# Patient Record
Sex: MALE | Race: BLACK OR AFRICAN AMERICAN | NOT HISPANIC OR LATINO | Employment: UNEMPLOYED | URBAN - METROPOLITAN AREA
[De-identification: names, ages, dates, MRNs, and addresses within clinical notes are randomized per-mention and may not be internally consistent; named-entity substitution may affect disease eponyms.]

---

## 2024-10-21 ENCOUNTER — HOSPITAL ENCOUNTER (OUTPATIENT)
Dept: RADIOLOGY | Facility: HOSPITAL | Age: 64
Discharge: HOME | End: 2024-10-21
Payer: MEDICAID

## 2024-10-21 DIAGNOSIS — I73.9 CLAUDICATION: ICD-10-CM

## 2024-10-21 DIAGNOSIS — I73.9 PAD (PERIPHERAL ARTERY DISEASE): ICD-10-CM

## 2024-10-21 PROCEDURE — 93922 UPR/L XTREMITY ART 2 LEVELS: CPT

## 2024-10-21 PROCEDURE — 93925 LOWER EXTREMITY STUDY: CPT

## 2024-10-22 LAB
LEFT ABI: 0.64
LEFT ARM BP: 140 MMHG
LEFT CFA PSV: 65 CM/S
LEFT DORSALIS PEDIS PSV: 30 CM/S
LEFT DORSALIS PEDIS: 98 MMHG
LEFT POPLITEAL PSV: 22 CM/S
LEFT POST TIBIAL SYS PSV: 22 CM/S
LEFT POSTERIOR TIBIAL: 102 MMHG
LEFT PROFUNDA SYS PSV: 117 CM/S
LEFT SUPER FEMORAL DIST SYS PSV: 0 CM/S
LEFT SUPER FEMORAL MID SYS PSV: 0 CM/S
LEFT SUPER FEMORAL PROX SYS PSV: 27 CM/S
OHS CV LEFT LOWER EXTREMITY ABI (NO CALC): 0.64
OHS CV RIGHT ABI LOWER EXTREMITY (NO CALC): 0.42
RIGHT ABI: 0.42
RIGHT ARM BP: 159 MMHG
RIGHT CFA PSV: 0 CM/S
RIGHT DORSALIS PEDIS PSV: 25 CM/S
RIGHT DORSALIS PEDIS: 66 MMHG
RIGHT EXTERNAL ILLIAC PSV: 0 CM/S
RIGHT POPLITEAL PSV: 25 CM/S
RIGHT POST TIBIAL SYS PSV: 13 CM/S
RIGHT POSTERIOR TIBIAL: 66 MMHG
RIGHT PROFUNDA SYS PSV: 44 CM/S
RIGHT SUPER FEMORAL DIST SYS PSV: 0 CM/S
RIGHT SUPER FEMORAL MID SYS PSV: 33 CM/S
RIGHT SUPER FEMORAL PROX SYS PSV: 32 CM/S

## 2024-10-23 ENCOUNTER — TELEPHONE (OUTPATIENT)
Dept: CARDIOLOGY | Facility: CLINIC | Age: 64
End: 2024-10-23
Payer: MEDICAID

## 2024-10-23 NOTE — TELEPHONE ENCOUNTER
Spoke with patient regarding results of arterial ultrasounds/ABIs.  Informed him that he does have significant flow reduction in bilateral lower extremities.  He voiced understanding.  Reminded him of his appointment with vascular surgery on October 29th at 3:30 P.M. Strongly encouraged him to make all efforts to attend this appointment.  In the meantime, strict ED precautions given. All questions answered.     Bridget Chambers PA-C

## 2024-10-29 ENCOUNTER — OFFICE VISIT (OUTPATIENT)
Dept: VASCULAR SURGERY | Facility: CLINIC | Age: 64
End: 2024-10-29
Payer: MEDICAID

## 2024-10-29 VITALS
DIASTOLIC BLOOD PRESSURE: 78 MMHG | SYSTOLIC BLOOD PRESSURE: 123 MMHG | WEIGHT: 139.19 LBS | HEART RATE: 70 BPM | BODY MASS INDEX: 21.17 KG/M2 | TEMPERATURE: 98 F | OXYGEN SATURATION: 98 % | RESPIRATION RATE: 20 BRPM

## 2024-10-29 DIAGNOSIS — I73.9 PERIPHERAL VASCULAR DISEASE, UNSPECIFIED: Primary | ICD-10-CM

## 2024-10-29 DIAGNOSIS — E78.2 MIXED HYPERLIPIDEMIA: ICD-10-CM

## 2024-10-29 DIAGNOSIS — I73.9 PAD (PERIPHERAL ARTERY DISEASE): ICD-10-CM

## 2024-10-29 DIAGNOSIS — M79.671 RIGHT FOOT PAIN: ICD-10-CM

## 2024-10-29 DIAGNOSIS — I10 PRIMARY HYPERTENSION: ICD-10-CM

## 2024-10-29 PROCEDURE — 99214 OFFICE O/P EST MOD 30 MIN: CPT | Mod: PBBFAC

## 2024-10-29 RX ORDER — ATORVASTATIN CALCIUM 80 MG/1
80 TABLET, FILM COATED ORAL NIGHTLY
Qty: 90 TABLET | Refills: 3 | Status: SHIPPED | OUTPATIENT
Start: 2024-10-29 | End: 2025-10-29

## 2024-10-29 RX ORDER — ASPIRIN 81 MG/1
81 TABLET ORAL DAILY
Qty: 90 TABLET | Refills: 3 | Status: SHIPPED | OUTPATIENT
Start: 2024-10-29 | End: 2025-10-29

## 2024-12-23 ENCOUNTER — HOSPITAL ENCOUNTER (OUTPATIENT)
Dept: RADIOLOGY | Facility: HOSPITAL | Age: 64
Discharge: HOME | End: 2024-12-23
Payer: MEDICAID

## 2024-12-23 DIAGNOSIS — I73.9 PERIPHERAL VASCULAR DISEASE, UNSPECIFIED: ICD-10-CM

## 2024-12-23 LAB
CREAT SERPL-MCNC: 0.98 MG/DL (ref 0.72–1.25)
GFR SERPLBLD CREATININE-BSD FMLA CKD-EPI: >60 ML/MIN/1.73/M2

## 2024-12-23 PROCEDURE — 25500020 PHARM REV CODE 255

## 2024-12-23 PROCEDURE — 75635 CT ANGIO ABDOMINAL ARTERIES: CPT | Mod: TC

## 2024-12-23 PROCEDURE — 82565 ASSAY OF CREATININE: CPT

## 2024-12-23 PROCEDURE — 36415 COLL VENOUS BLD VENIPUNCTURE: CPT

## 2024-12-23 RX ADMIN — IOHEXOL 100 ML: 350 INJECTION, SOLUTION INTRAVENOUS at 05:12

## 2025-01-28 ENCOUNTER — OFFICE VISIT (OUTPATIENT)
Dept: VASCULAR SURGERY | Facility: CLINIC | Age: 65
End: 2025-01-28
Payer: MEDICAID

## 2025-01-28 VITALS
RESPIRATION RATE: 18 BRPM | DIASTOLIC BLOOD PRESSURE: 68 MMHG | HEART RATE: 81 BPM | SYSTOLIC BLOOD PRESSURE: 153 MMHG | TEMPERATURE: 98 F | BODY MASS INDEX: 20.92 KG/M2 | OXYGEN SATURATION: 97 % | HEIGHT: 68 IN | WEIGHT: 138 LBS

## 2025-01-28 DIAGNOSIS — I73.9 PERIPHERAL ARTERIAL DISEASE: Primary | ICD-10-CM

## 2025-01-28 DIAGNOSIS — I73.9 PAD (PERIPHERAL ARTERY DISEASE): ICD-10-CM

## 2025-01-28 PROCEDURE — 99215 OFFICE O/P EST HI 40 MIN: CPT | Mod: PBBFAC

## 2025-01-28 RX ORDER — SODIUM CHLORIDE 0.9 % (FLUSH) 0.9 %
10 SYRINGE (ML) INJECTION
OUTPATIENT
Start: 2025-01-28

## 2025-01-28 RX ORDER — ACETAMINOPHEN 325 MG/1
650 TABLET ORAL EVERY 8 HOURS PRN
OUTPATIENT
Start: 2025-01-28

## 2025-01-28 RX ORDER — ONDANSETRON 4 MG/1
8 TABLET, ORALLY DISINTEGRATING ORAL EVERY 8 HOURS PRN
OUTPATIENT
Start: 2025-01-28

## 2025-01-28 RX ORDER — CILOSTAZOL 50 MG/1
50 TABLET ORAL 2 TIMES DAILY
Qty: 180 TABLET | Refills: 2 | Status: SHIPPED | OUTPATIENT
Start: 2025-01-28 | End: 2026-01-28

## 2025-01-28 RX ORDER — ASPIRIN 81 MG/1
81 TABLET ORAL DAILY
Qty: 90 TABLET | Refills: 3 | Status: SHIPPED | OUTPATIENT
Start: 2025-01-28 | End: 2026-01-28

## 2025-01-28 RX ORDER — CEFAZOLIN SODIUM 2 G/50ML
2 SOLUTION INTRAVENOUS
OUTPATIENT
Start: 2025-01-28

## 2025-01-28 RX ORDER — HEPARIN SODIUM 5000 [USP'U]/ML
5000 INJECTION, SOLUTION INTRAVENOUS; SUBCUTANEOUS ONCE
OUTPATIENT
Start: 2025-01-28

## 2025-01-28 RX ORDER — LIDOCAINE HYDROCHLORIDE 10 MG/ML
1 INJECTION, SOLUTION EPIDURAL; INFILTRATION; INTRACAUDAL; PERINEURAL ONCE AS NEEDED
OUTPATIENT
Start: 2025-01-28 | End: 2036-06-26

## 2025-01-28 RX ORDER — TALC
6 POWDER (GRAM) TOPICAL NIGHTLY PRN
OUTPATIENT
Start: 2025-01-28

## 2025-01-28 NOTE — PROGRESS NOTES
Seen by Dr. Perez.  Surgery consent signed and witnessed.  Outpatient surgery instruction sheet reviewed and given to patient. Surgery scheduled 2/7/25.  Cardiology clearance referral placed.  Discharge instructions given.

## 2025-01-28 NOTE — H&P (VIEW-ONLY)
[facility] General Surgery Clinic Note    HPI:     ABENA Richards, 64 y.o. male, pmhx HTN, HLD presents to vascular clinic for PAD follow up. He was last seen in vascular clinic 10/29/24. CTA showing extensive atherosclerotic disease including R FRED, EIA occlusion. He c/o continued RLE claudication with occasional LLE claudication. He also reports dyspnea on exertion in addition to this. He denies rest pain and foot wounds. Denies CP. He states that he ran out of his medications so he hasn't been taking them. He denies a hx of stroke, previous MI or DM. He also stated that he does not currently follow with a PCP.    PMH:   Past Medical History:   Diagnosis Date    Hyperlipidemia     Hypertension       Meds:   Current Outpatient Medications:     amLODIPine (NORVASC) 5 MG tablet, Take 1 tablet (5 mg total) by mouth once daily. (Patient not taking: Reported on 1/28/2025), Disp: 90 tablet, Rfl: 2    aspirin (ECOTRIN) 81 MG EC tablet, Take 1 tablet (81 mg total) by mouth once daily., Disp: 90 tablet, Rfl: 3    atorvastatin (LIPITOR) 80 MG tablet, Take 1 tablet (80 mg total) by mouth every evening., Disp: 90 tablet, Rfl: 3    cilostazoL (PLETAL) 50 MG Tab, Take 1 tablet (50 mg total) by mouth 2 (two) times daily., Disp: 180 tablet, Rfl: 2    gabapentin (NEURONTIN) 300 MG capsule, Take 1 capsule (300 mg total) by mouth 2 (two) times daily. (Patient not taking: Reported on 1/28/2025), Disp: 180 capsule, Rfl: 2    lisinopriL (PRINIVIL,ZESTRIL) 2.5 MG tablet, Take 1 tablet (2.5 mg total) by mouth once daily. (Patient not taking: Reported on 1/28/2025), Disp: 90 tablet, Rfl: 2    potassium chloride SA (K-DUR,KLOR-CON) 20 MEQ tablet, Take 1 tablet (20 mEq total) by mouth 2 (two) times daily. (Patient not taking: Reported on 1/28/2025), Disp: 180 tablet, Rfl: 2  Allergies: Review of patient's allergies indicates:  No Known Allergies  Social History:   Social History     Tobacco Use    Smoking status: Former     Types: Vaping with  "nicotine    Smokeless tobacco: Never   Substance Use Topics    Alcohol use: Not Currently     Comment: stopped 5-10 years ago    Drug use: Yes     Types: Marijuana     Comment: "I smoke a lot" everyday     Family History:   Family History   Problem Relation Name Age of Onset    Hypertension Mother       Surgical History:   Past Surgical History:   Procedure Laterality Date    FINGER AMPUTATION Left     5th digit     Review of Systems:  Skin: No rashes or itching.  Head: Denies headache or recent trauma.  Eyes: Denies eye pain or double vision.  Neck: Denies swelling or hoarseness of voice.  Respiratory: Denies shortness of breath or chest pain  Cardiac: Denies palpitations or swelling in hands/feet.  Gastrointestinal: Denies nausea, denies vomiting.   Urinary: Denies dysuria or hematuria.  Vascular: Denies claudication or leg swelling.  Neuro: Denies motor deficits. Denies weakness.  Endocrine: Denies excessive sweating or cold intolerance.  Psych: Denies memory problems. Denies anxiety.    Objective:    Vitals:  Vitals:    01/28/25 1542   BP: (!) 153/68   Pulse:    Resp:    Temp:         Physical Exam:  Gen: NAD  Neuro: awake, alert, answering questions appropriately  CV: RR  Resp: non-labored breathing  Abd: soft, ND, NT  Ext: moves all 4 spontaneously and purposefully  RLE: Monophasic Fem, pop, DP/PT  LLE: Palpable Fem, Biphasic pop, DP/PT  Skin: warm, well perfused    Pertinent Labs:  None    Imaging:  Reading Physician Reading Date Result Priority   Reny Ratliff MD  [phone] 12/23/2024 Routine     Narrative & Impression  EXAMINATION:  CTA RUNOFF ABD PEL BILAT LOWER EXT     CLINICAL HISTORY:  Claudication or leg ischemia;  Peripheral vascular disease, unspecified     TECHNIQUE:  Helically acquired images were obtained from the lung bases through the lower extremities prior to and after IV administration of contrast. Axial, sagittal, coronal and MIP reformations were interpreted.     Automated tube " current modulation, weight-based exposure dosing, and/or iterative reconstruction technique utilized to reach lowest reasonably achievable exposure rate.     DLP: 428 mGy*cm     COMPARISON:  CTA runoff 11/07/2022     FINDINGS:  VASCULATURE:     Aorta: Atherosclerosis without aneurysm.     Mesenteric arteries: No significant stenosis.     Renal arteries:No significant stenosis.     Right:     Iliac arteries: Right common iliac artery and external iliac artery occluded.  Proximal internal iliac artery occluded.  Collateral reconstitution of flow at internal iliac branches.     Femoral: Occluded common femoral artery.  Collateral reconstitution of flow at the common femoral bifurcation.  Multifocal atherosclerotic calcifications at the SFA with distal occlusion.  Patent deep femoral branches.     Popliteal: Collateral reconstitution of faintly visible diminutive popliteal artery for a short segment     Tibioperoneal trunk: Tibioperoneal trunk not well seen.  Collateral reconstitution of flow at the anterior tibial, posterior tibial and peroneal arteries below the trifurcation which are diminutive but patent to the ankle.     Left:     Iliac arteries: Atherosclerosis.  Common iliac artery is dilated measuring 1.8 cm in diameter similar to previous.  Patent runoff to the groin.     Femoral: Atherosclerosis at the superficial femoral artery with severe stenosis at the mid SFA and distal occlusion.  Collateral reconstitution of flow distally.  Patent deep femoral branches.     Popliteal: No significant stenosis.     Tibioperoneal trunk: Atherosclerosis at the tibioperoneal trunk without significant stenosis.  Three-vessel runoff to the ankle.     HEART: There are coronary artery calcifications.     LUNG BASES: Dependent atelectasis.     LIVER: Reflux into the hepatic veins.     BILIARY: No calcified gallstones.     PANCREAS: No inflammatory change.     SPLEEN: Normal in size     ADRENALS: Chronic thickening of the adrenal  glands, unchanged.     KIDNEYS/URETERS: No hydronephrosis.     GI TRACT/MESENTERY: No evidence of bowel obstruction.  No evidence of bowel obstruction or inflammation.     PERITONEUM: No free fluid.No free air.     LYMPH NODES: No enlarged lymph nodes by size criteria.     BLADDER: Normal appearance given degree of distention.     REPRODUCTIVE ORGANS: Prostatomegaly.     SOFT TISSUES: Unremarkable.     BONES: Degenerative change at the spine.     Impression:     1. Right leg.  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  Occlusion at the distal SFA, new compared to 2022 exam.  Collateral reconstitution of flow below the trifurcation with three-vessel runoff to the ankle.  2. Left leg.  Chronic occlusion at the distal SFA with collateral reconstitution of flow distally and three-vessel runoff to the ankle.        Electronically signed by:Rney Ratliff  Date:                                            12/23/2024  Time:                                           18:44      Assessment/Plan:  Jose Richards is a 64 y.o. male with PMHx HTN, HLD, PAD who presents to vascular clinic for follow up of PAD with CTA. He reports continued RLE claudication symptoms, no tissue loss or rest pain. Imaging with R FRED/EIA occlusion. Pt would benefit from Left-to-right Fem-Fem bypass for occlusive iliac disease.     - Reordered pt's pletal and ASA 81  - Preop cardiac risk stratification  - PCP referral placed  - R/B/A of Left-to-right Fem-Fem bypass discussed with patient, written informed consent obtained  - Case request placed for 2/7/25    Rashard Johnson Medical Student    I have seen and examined the patient with the medical student. I agree with the assessment and plan and have made the appropriate edits to the note above.     Moreno Lynn MD  [facility], PGY-1

## 2025-01-28 NOTE — PROGRESS NOTES
[facility] General Surgery Clinic Note    HPI:     ABENA Richards, 64 y.o. male, pmhx HTN, HLD presents to vascular clinic for PAD follow up. He was last seen in vascular clinic 10/29/24. CTA showing extensive atherosclerotic disease including R FRED, EIA occlusion. He c/o continued RLE claudication with occasional LLE claudication. He also reports dyspnea on exertion in addition to this. He denies rest pain and foot wounds. Denies CP. He states that he ran out of his medications so he hasn't been taking them. He denies a hx of stroke, previous MI or DM. He also stated that he does not currently follow with a PCP.    PMH:   Past Medical History:   Diagnosis Date    Hyperlipidemia     Hypertension       Meds:   Current Outpatient Medications:     amLODIPine (NORVASC) 5 MG tablet, Take 1 tablet (5 mg total) by mouth once daily. (Patient not taking: Reported on 1/28/2025), Disp: 90 tablet, Rfl: 2    aspirin (ECOTRIN) 81 MG EC tablet, Take 1 tablet (81 mg total) by mouth once daily., Disp: 90 tablet, Rfl: 3    atorvastatin (LIPITOR) 80 MG tablet, Take 1 tablet (80 mg total) by mouth every evening., Disp: 90 tablet, Rfl: 3    cilostazoL (PLETAL) 50 MG Tab, Take 1 tablet (50 mg total) by mouth 2 (two) times daily., Disp: 180 tablet, Rfl: 2    gabapentin (NEURONTIN) 300 MG capsule, Take 1 capsule (300 mg total) by mouth 2 (two) times daily. (Patient not taking: Reported on 1/28/2025), Disp: 180 capsule, Rfl: 2    lisinopriL (PRINIVIL,ZESTRIL) 2.5 MG tablet, Take 1 tablet (2.5 mg total) by mouth once daily. (Patient not taking: Reported on 1/28/2025), Disp: 90 tablet, Rfl: 2    potassium chloride SA (K-DUR,KLOR-CON) 20 MEQ tablet, Take 1 tablet (20 mEq total) by mouth 2 (two) times daily. (Patient not taking: Reported on 1/28/2025), Disp: 180 tablet, Rfl: 2  Allergies: Review of patient's allergies indicates:  No Known Allergies  Social History:   Social History     Tobacco Use    Smoking status: Former     Types: Vaping with  "nicotine    Smokeless tobacco: Never   Substance Use Topics    Alcohol use: Not Currently     Comment: stopped 5-10 years ago    Drug use: Yes     Types: Marijuana     Comment: "I smoke a lot" everyday     Family History:   Family History   Problem Relation Name Age of Onset    Hypertension Mother       Surgical History:   Past Surgical History:   Procedure Laterality Date    FINGER AMPUTATION Left     5th digit     Review of Systems:  Skin: No rashes or itching.  Head: Denies headache or recent trauma.  Eyes: Denies eye pain or double vision.  Neck: Denies swelling or hoarseness of voice.  Respiratory: Denies shortness of breath or chest pain  Cardiac: Denies palpitations or swelling in hands/feet.  Gastrointestinal: Denies nausea, denies vomiting.   Urinary: Denies dysuria or hematuria.  Vascular: Denies claudication or leg swelling.  Neuro: Denies motor deficits. Denies weakness.  Endocrine: Denies excessive sweating or cold intolerance.  Psych: Denies memory problems. Denies anxiety.    Objective:    Vitals:  Vitals:    01/28/25 1542   BP: (!) 153/68   Pulse:    Resp:    Temp:         Physical Exam:  Gen: NAD  Neuro: awake, alert, answering questions appropriately  CV: RR  Resp: non-labored breathing  Abd: soft, ND, NT  Ext: moves all 4 spontaneously and purposefully  RLE: Monophasic Fem, pop, DP/PT  LLE: Palpable Fem, Biphasic pop, DP/PT  Skin: warm, well perfused    Pertinent Labs:  None    Imaging:  Reading Physician Reading Date Result Priority   Reny Ratliff MD  [phone] 12/23/2024 Routine     Narrative & Impression  EXAMINATION:  CTA RUNOFF ABD PEL BILAT LOWER EXT     CLINICAL HISTORY:  Claudication or leg ischemia;  Peripheral vascular disease, unspecified     TECHNIQUE:  Helically acquired images were obtained from the lung bases through the lower extremities prior to and after IV administration of contrast. Axial, sagittal, coronal and MIP reformations were interpreted.     Automated tube " current modulation, weight-based exposure dosing, and/or iterative reconstruction technique utilized to reach lowest reasonably achievable exposure rate.     DLP: 428 mGy*cm     COMPARISON:  CTA runoff 11/07/2022     FINDINGS:  VASCULATURE:     Aorta: Atherosclerosis without aneurysm.     Mesenteric arteries: No significant stenosis.     Renal arteries:No significant stenosis.     Right:     Iliac arteries: Right common iliac artery and external iliac artery occluded.  Proximal internal iliac artery occluded.  Collateral reconstitution of flow at internal iliac branches.     Femoral: Occluded common femoral artery.  Collateral reconstitution of flow at the common femoral bifurcation.  Multifocal atherosclerotic calcifications at the SFA with distal occlusion.  Patent deep femoral branches.     Popliteal: Collateral reconstitution of faintly visible diminutive popliteal artery for a short segment     Tibioperoneal trunk: Tibioperoneal trunk not well seen.  Collateral reconstitution of flow at the anterior tibial, posterior tibial and peroneal arteries below the trifurcation which are diminutive but patent to the ankle.     Left:     Iliac arteries: Atherosclerosis.  Common iliac artery is dilated measuring 1.8 cm in diameter similar to previous.  Patent runoff to the groin.     Femoral: Atherosclerosis at the superficial femoral artery with severe stenosis at the mid SFA and distal occlusion.  Collateral reconstitution of flow distally.  Patent deep femoral branches.     Popliteal: No significant stenosis.     Tibioperoneal trunk: Atherosclerosis at the tibioperoneal trunk without significant stenosis.  Three-vessel runoff to the ankle.     HEART: There are coronary artery calcifications.     LUNG BASES: Dependent atelectasis.     LIVER: Reflux into the hepatic veins.     BILIARY: No calcified gallstones.     PANCREAS: No inflammatory change.     SPLEEN: Normal in size     ADRENALS: Chronic thickening of the adrenal  glands, unchanged.     KIDNEYS/URETERS: No hydronephrosis.     GI TRACT/MESENTERY: No evidence of bowel obstruction.  No evidence of bowel obstruction or inflammation.     PERITONEUM: No free fluid.No free air.     LYMPH NODES: No enlarged lymph nodes by size criteria.     BLADDER: Normal appearance given degree of distention.     REPRODUCTIVE ORGANS: Prostatomegaly.     SOFT TISSUES: Unremarkable.     BONES: Degenerative change at the spine.     Impression:     1. Right leg.  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  Occlusion at the distal SFA, new compared to 2022 exam.  Collateral reconstitution of flow below the trifurcation with three-vessel runoff to the ankle.  2. Left leg.  Chronic occlusion at the distal SFA with collateral reconstitution of flow distally and three-vessel runoff to the ankle.        Electronically signed by:Reny Ratliff  Date:                                            12/23/2024  Time:                                           18:44      Assessment/Plan:  Jose Richards is a 64 y.o. male with PMHx HTN, HLD, PAD who presents to vascular clinic for follow up of PAD with CTA. He reports continued RLE claudication symptoms, no tissue loss or rest pain. Imaging with R FRED/EIA occlusion. Pt would benefit from Left-to-right Fem-Fem bypass for occlusive iliac disease.     - Reordered pt's pletal and ASA 81  - Preop cardiac risk stratification  - PCP referral placed  - R/B/A of Left-to-right Fem-Fem bypass discussed with patient, written informed consent obtained  - Case request placed for 2/7/25    Rashard Johnson Medical Student    I have seen and examined the patient with the medical student. I agree with the assessment and plan and have made the appropriate edits to the note above.     Moreno Lynn MD  [facility], PGY-1

## 2025-01-30 ENCOUNTER — ANESTHESIA EVENT (OUTPATIENT)
Dept: SURGERY | Facility: HOSPITAL | Age: 65
End: 2025-01-30
Payer: MEDICAID

## 2025-01-30 NOTE — ANESTHESIA PREPROCEDURE EVALUATION
ABENA Richards is a 64 y.o. male PRESENTING FOR CREATION, BYPASS, ARTERIAL, FEMORAL TO FEMORAL, USING GRAFT (Chest) with a history of   -Peripheral arterial disease   -HTN  -HLD  -HYPOKALEMIA  -FORMER SMOKER/vapes  -MARIJUANA USER      BETA-BLOCKER: NONE  PLETAL LD: CONT PER DR. MANRIQUEZ  ASA LD: CONT PER DR. MANRIQUEZ    New Orders for Anesthesia: NONE    Patient Active Problem List   Diagnosis    Immunization due    Hip pain, chronic, right    Primary hypertension    History of tobacco abuse    BMI 21.0-21.9, adult    Marijuana abuse    PAD (peripheral artery disease)    Mixed hyperlipidemia    Hypokalemia    Abnormal CT scan    Screening for malignant neoplasm of colon    Vapes nicotine containing substance    Non-compliant patient       Past Surgical History:   Procedure Laterality Date    FINGER AMPUTATION Left     5th digit       Lab Results   Component Value Date    WBC 5.43 08/12/2024    HGB 14.6 08/12/2024    HCT 44.9 08/12/2024     08/12/2024       CMP  Sodium   Date Value Ref Range Status   08/12/2024 139 136 - 145 mmol/L Final     Potassium   Date Value Ref Range Status   08/12/2024 3.5 3.5 - 5.1 mmol/L Final     Chloride   Date Value Ref Range Status   08/12/2024 107 98 - 107 mmol/L Final     CO2   Date Value Ref Range Status   08/12/2024 25 23 - 31 mmol/L Final     Blood Urea Nitrogen   Date Value Ref Range Status   08/12/2024 7.9 (L) 8.4 - 25.7 mg/dL Final     Creatinine   Date Value Ref Range Status   12/23/2024 0.98 0.72 - 1.25 mg/dL Final     Calcium   Date Value Ref Range Status   08/12/2024 9.3 8.8 - 10.0 mg/dL Final     Albumin   Date Value Ref Range Status   08/12/2024 3.6 3.4 - 4.8 g/dL Final     Bilirubin Total   Date Value Ref Range Status   08/12/2024 0.8 <=1.5 mg/dL Final     ALP   Date Value Ref Range Status   08/12/2024 101 40 - 150 unit/L Final     AST   Date Value Ref Range Status   08/12/2024 13 5 - 34 unit/L Final     ALT   Date Value Ref Range Status   08/12/2024 10 0 - 55  unit/L Final     eGFR   Date Value Ref Range Status   12/23/2024 >60 mL/min/1.73/m2 Final       Lab Results   Component Value Date    INR 1.0 06/10/2021       Lab Results   Component Value Date    APTT 32.2 06/10/2021           CARDS OV 2/6/25      Current Outpatient Medications   Medication Instructions    amLODIPine (NORVASC) 5 mg, Oral, Daily    aspirin (ECOTRIN) 81 mg, Oral, Daily    atorvastatin (LIPITOR) 80 mg, Oral, Nightly    cilostazoL (PLETAL) 50 mg, Oral, 2 times daily    gabapentin (NEURONTIN) 300 mg, Oral, 2 times daily    lisinopriL (PRINIVIL,ZESTRIL) 2.5 mg, Oral, Daily    potassium chloride SA (K-DUR,KLOR-CON) 20 MEQ tablet 20 mEq, Oral, 2 times daily       Pre-op Assessment    I have reviewed the Patient Summary Reports.     I have reviewed the Nursing Notes. I have reviewed the NPO Status.   I have reviewed the Medications.     Review of Systems  Anesthesia Hx:  No problems with previous Anesthesia             Denies Family Hx of Anesthesia complications.    Denies Personal Hx of Anesthesia complications.                    Social:  Non-Smoker, No Alcohol Use       Hematology/Oncology:  Hematology Normal   Oncology Normal                                   EENT/Dental:  EENT/Dental Normal           Cardiovascular:     Hypertension, well controlled                                          Pulmonary:  Pulmonary Normal                       Renal/:  Renal/ Normal                 Hepatic/GI:  Hepatic/GI Normal                    Neurological:  Neurology Normal                                      Endocrine:  Endocrine Normal            Dermatological:  Skin Normal    Psych:  Psychiatric Normal                    Physical Exam  General: Well nourished, Cooperative, Alert and Oriented    Airway:  Mallampati: II / II  Mouth Opening: Normal  TM Distance: Normal  Tongue: Normal  Neck ROM: Normal ROM    Dental:  Intact        Anesthesia Plan  Type of Anesthesia, risks & benefits discussed:    Anesthesia  Type: Gen ETT, Gen Supraglottic Airway, Gen Natural Airway  Intra-op Monitoring Plan: Standard ASA Monitors and Art Line  Post Op Pain Control Plan: IV/PO Opioids PRN  Induction:  IV  Airway Plan: Direct, Post-Induction  Informed Consent: Informed consent signed with the Patient and all parties understand the risks and agree with anesthesia plan.  All questions answered.   ASA Score: 3  Day of Surgery Review of History & Physical: I have interviewed and examined the patient. I have reviewed the patient's H&P dated:     Ready For Surgery From Anesthesia Perspective.     .

## 2025-01-31 NOTE — OR NURSING
"[facility] NOTE:    DATE OF SURGERY:  2/7/2025  Creation/Bypass/Arterial/Fem to Fem    Called and completed pt's PAT, he is a poor historian and non compliant with medications. He states he ran out of ALL meds, not sure when. He is prescribed ASA and Pletal, "I don't know how long I took that, I ran out and that's it." Pt advised he has RX for his blood thinners at [facility] and its important to pick them up. Pt states he is "going today" (1/30) to get them. Pt reminded of his appointment with Cardiology on 2/3 as well.     *Per Nisreen Franco LPN in Vascular, he will receive recommendations for the anticoags at that time. Pt updated on plan.   "

## 2025-02-04 ENCOUNTER — TELEPHONE (OUTPATIENT)
Dept: VASCULAR SURGERY | Facility: CLINIC | Age: 65
End: 2025-02-04
Payer: MEDICAID

## 2025-02-04 NOTE — TELEPHONE ENCOUNTER
Patient did not show up for his Cardiology appointment on 2/3, per Warren Ramos NP with [facility] and needed this appointment prior to his Fem Pop Bypass that is scheduled for 2/7/25. Lucina Colon with Cardiology was contacted the to ask if there is another available appointment and she was unable to reach the patient leaving him a voicemail to offer him an appointment for 2/6.     Dr. Farrell sent a message to Rashida Campos, surgery scheduler, stating: We will still need to reschedule his surgery bc he needs to hold his anticoagulation.  Will look into our schedule for when that will be.  She also asked Rashida to remove him from the schedule until they get a new surgery date.She agreed.     Phoned patient to explain that he needs to see Cardiology for clearance and anticoagulant instructions prior to surgery. Also told him that surgery will most likely not happen on 2/7/25.  Gave him the number for Cardiology and he stated that he will call them to try to get an appointment.  He stated that he understood the above.    Patient called Cardiology and he has an appointment for 02/06/25 @ 10:30 am.

## 2025-02-06 ENCOUNTER — OFFICE VISIT (OUTPATIENT)
Dept: CARDIOLOGY | Facility: CLINIC | Age: 65
End: 2025-02-06
Payer: MEDICAID

## 2025-02-06 VITALS
TEMPERATURE: 98 F | OXYGEN SATURATION: 97 % | HEART RATE: 64 BPM | DIASTOLIC BLOOD PRESSURE: 78 MMHG | WEIGHT: 142.38 LBS | SYSTOLIC BLOOD PRESSURE: 136 MMHG | BODY MASS INDEX: 21.58 KG/M2 | RESPIRATION RATE: 20 BRPM | HEIGHT: 68 IN

## 2025-02-06 DIAGNOSIS — I73.9 PAD (PERIPHERAL ARTERY DISEASE): ICD-10-CM

## 2025-02-06 DIAGNOSIS — I10 PRIMARY HYPERTENSION: ICD-10-CM

## 2025-02-06 DIAGNOSIS — E78.2 MIXED HYPERLIPIDEMIA: ICD-10-CM

## 2025-02-06 PROCEDURE — 99214 OFFICE O/P EST MOD 30 MIN: CPT | Mod: PBBFAC | Performed by: INTERNAL MEDICINE

## 2025-02-06 RX ORDER — AMLODIPINE BESYLATE 5 MG/1
5 TABLET ORAL DAILY
Qty: 90 TABLET | Refills: 1 | Status: SHIPPED | OUTPATIENT
Start: 2025-02-06 | End: 2026-02-06

## 2025-02-06 RX ORDER — ASPIRIN 81 MG/1
81 TABLET ORAL DAILY
Qty: 90 TABLET | Refills: 1 | Status: ON HOLD | OUTPATIENT
Start: 2025-02-06 | End: 2025-02-14

## 2025-02-06 RX ORDER — ATORVASTATIN CALCIUM 80 MG/1
80 TABLET, FILM COATED ORAL NIGHTLY
Qty: 90 TABLET | Refills: 1 | Status: SHIPPED | OUTPATIENT
Start: 2025-02-06 | End: 2026-02-06

## 2025-02-06 RX ORDER — LISINOPRIL 2.5 MG/1
2.5 TABLET ORAL DAILY
Qty: 90 TABLET | Refills: 1 | Status: SHIPPED | OUTPATIENT
Start: 2025-02-06 | End: 2026-02-06

## 2025-02-06 RX ORDER — CILOSTAZOL 50 MG/1
50 TABLET ORAL 2 TIMES DAILY
Qty: 180 TABLET | Refills: 1 | Status: SHIPPED | OUTPATIENT
Start: 2025-02-06 | End: 2026-02-06

## 2025-02-06 NOTE — PROGRESS NOTES
[facility]   Cardiology Clinic - Follow Up     Date of Visit: 2/6/2025    Subjective:      ABENA Richards is a 64 y.o. male severe PAD (planned L fem-R fem bypass), presumed CAD (coronary artery calcification on CTA), HTN, HLD, previous tobacco abuse, and chronic THC use who presents for follow up and pre-operative risk stratification.     The patient has a planned left-to-right fem-fem bypass with vascular surgery.  He has following up for preoperative risk stratification.  The patient's RCRI score is 0.  Reports he can perform light, moderate, and heavy duty housework.  The patient reports he can take care of himself, walk 1 block, climb 2 flights of stairs though would need to take breaks secondary to claudication, rake leaves or pull weeds, and have sex relations.  The patient reports he can not jog is short distance, participate in moderate sporting activities or extensive sporting activities.  The patient denies symptoms of angina or anginal equivalents including chart, dyspnea on exertion, or early fatigue along with nonspecific potential anginal equivalents including for nausea or emesis with exertion, diaphoresis, lightheadedness or dizziness.  The patient denies congestive symptoms of heart failure including dyspnea on exertion, orthopnea, PND, abdominal distention, early satiety, or lower extremity edema.    The patient does not check his blood pressure at home.  The patient does not know what blood pressure medications he is on at home.    The patient does not know what atorvastatin is.  The patient does not know what cilostazol is.  The patient reports he takes his aspirin sometimes.     The patient reports continued claudication symptoms.  He denies rest pain.  He denies wounds.      Medications:     Current Outpatient Medications   Medication Sig Dispense Refill    amLODIPine (NORVASC) 5 MG tablet Take 1 tablet (5 mg total) by mouth once daily. 90 tablet 2    aspirin (ECOTRIN) 81 MG EC  "tablet Take 1 tablet (81 mg total) by mouth once daily. 90 tablet 3    atorvastatin (LIPITOR) 80 MG tablet Take 1 tablet (80 mg total) by mouth every evening. 90 tablet 3    cilostazoL (PLETAL) 50 MG Tab Take 1 tablet (50 mg total) by mouth 2 (two) times daily. 180 tablet 2    gabapentin (NEURONTIN) 300 MG capsule Take 1 capsule (300 mg total) by mouth 2 (two) times daily. (Patient not taking: Reported on 10/29/2024) 180 capsule 2    lisinopriL (PRINIVIL,ZESTRIL) 2.5 MG tablet Take 1 tablet (2.5 mg total) by mouth once daily. (Patient not taking: Reported on 10/29/2024) 90 tablet 2    potassium chloride SA (K-DUR,KLOR-CON) 20 MEQ tablet Take 1 tablet (20 mEq total) by mouth 2 (two) times daily. (Patient not taking: Reported on 10/29/2024) 180 tablet 2     No current facility-administered medications for this visit.       I have reviewed and updated the patient's medications, allergies, past medical history, surgical history, social history and family history as needed.    Review of Systems:     The patient denies headaches, changes in vision, nausea, emesis, fevers, chills, chest pain, palpitations, dyspnea, abdominal pain, or changes in urinary or bowel habits.       Objective:     Wt Readings from Last 3 Encounters:   02/06/25 64.6 kg (142 lb 6.4 oz)   01/28/25 62.6 kg (138 lb)   10/29/24 63.1 kg (139 lb 3.2 oz)     Temp Readings from Last 3 Encounters:   02/06/25 98.2 °F (36.8 °C) (Oral)   01/28/25 97.9 °F (36.6 °C)   10/29/24 98.3 °F (36.8 °C) (Oral)     BP Readings from Last 3 Encounters:   02/06/25 136/78   01/28/25 (!) 153/68   10/29/24 123/78     Pulse Readings from Last 3 Encounters:   02/06/25 64   01/28/25 81   10/29/24 70       Vitals:    02/06/25 1117 02/06/25 1147   BP: (!) 160/76 136/78   BP Location: Left arm Right arm   Patient Position: Sitting Sitting   Pulse: 64    Resp: 20    Temp: 98.2 °F (36.8 °C)    TempSrc: Oral    SpO2: 97%    Weight: 64.6 kg (142 lb 6.4 oz)    Height: 5' 8" (1.727 m)  "     Body mass index is 21.65 kg/m².    Physical Exam  Vitals reviewed.   Constitutional:       General: He is not in acute distress.     Appearance: Normal appearance. He is normal weight. He is not ill-appearing.   HENT:      Head: Normocephalic and atraumatic.      Right Ear: External ear normal.      Left Ear: External ear normal.      Nose: Nose normal.      Mouth/Throat:      Mouth: Mucous membranes are moist.   Eyes:      Conjunctiva/sclera: Conjunctivae normal.   Cardiovascular:      Rate and Rhythm: Normal rate and regular rhythm.      Pulses: Normal pulses.      Heart sounds: Normal heart sounds. No murmur heard.  Pulmonary:      Effort: Pulmonary effort is normal. No respiratory distress.      Breath sounds: Normal breath sounds. No stridor. No wheezing, rhonchi or rales.   Chest:      Chest wall: No tenderness.   Abdominal:      General: Abdomen is flat. Bowel sounds are normal. There is no distension.      Palpations: Abdomen is soft.   Musculoskeletal:      Cervical back: Neck supple.      Right lower leg: No edema.      Left lower leg: No edema.   Skin:     General: Skin is warm and dry.   Neurological:      Mental Status: He is alert and oriented to person, place, and time.   Psychiatric:         Mood and Affect: Mood normal.         Behavior: Behavior normal.        Labs:   I have reviewed the following labs below:      Lab Results   Component Value Date    WBC 5.43 08/12/2024    HGB 14.6 08/12/2024    HCT 44.9 08/12/2024     08/12/2024    MCV 91.6 08/12/2024    RDW 12.9 08/12/2024     Lab Results   Component Value Date     08/12/2024    K 3.5 08/12/2024     08/12/2024    CO2 25 08/12/2024    BUN 7.9 (L) 08/12/2024    CALCIUM 9.3 08/12/2024    PHOS 3.7 11/24/2021     Lab Results   Component Value Date    ALBUMIN 3.6 08/12/2024    BILITOT 0.8 08/12/2024    AST 13 08/12/2024    ALKPHOS 101 08/12/2024    ALT 10 08/12/2024     Cholesterol Total   Date Value Ref Range Status    08/12/2024 159 <=200 mg/dL Final     HDL Cholesterol   Date Value Ref Range Status   08/12/2024 55 35 - 60 mg/dL Final     LDL Cholesterol   Date Value Ref Range Status   08/12/2024 97.00 50.00 - 140.00 mg/dL Final     Triglyceride   Date Value Ref Range Status   08/12/2024 37 34 - 140 mg/dL Final     Lab Results   Component Value Date    HGBA1C 5.2 08/12/2024    HGBA1C 4.9 08/10/2023    HGBA1C 4.7 07/13/2022    CREATININE 0.98 12/23/2024     Lab Results   Component Value Date    TSH 0.399 08/12/2024     Lab Results   Component Value Date    IRON 50 (L) 08/12/2024    TIBC 215 (L) 08/12/2024    FERRITIN 117.84 08/12/2024     Lab Results   Component Value Date    INR 1.0 06/10/2021    APTT 32.2 06/10/2021      Lab Results   Component Value Date    TROPONINI 0.016 03/10/2023    TROPONINI <0.010 11/22/2021    BNP 36.2 03/10/2023     Cardiovascular Studies:   I have reviewed the following studies below:      TTE:   None    LHC/Cors:  None    Assessment/Plan:   ABENA Richards is a 64 y.o. male severe PAD (planned L fem-R fem bypass), presumed CAD (coronary artery calcification on CTA), HTN, HLD, previous tobacco abuse, and chronic THC use who presents for follow up and pre-operative risk stratification.     Plan:  Peripheral Artery Disease with Claudication and Planned Left to Right Fem-Fem Bypass (Pre-Operative Risk Stratification)  -Claudicant. Does not know with Pletal is thus unsure of adherence. No rest symptoms or wounds.   -Continue ASA 81 mg daily.   -Resume Lipitor 80 mg daily. When asked if the patient was on this medication he did not know what it was.   -Target LDL < 55. If he is adherent to therapy and not at goal start Zetia 10 mg daily. If he remains not at goal on Lipitor and Zetia start PCSK9i.   -Resume Pletal 50 mg BID.   -In the future after revascularization consider starting Xarelto 2.5 mg BID in addition to ASA 81 mg. COMPASS and JONO PAD Trial.    -Based on the patient's RCRI score of 0 and  DASI which suggest he can achieve > 4 METs the patient is low risk for a moderate risk procedure without planned additional cardiovascular pre-operative testing.     Presumed Coronary Artery Disease with Coronary Artery Calcification on CTA  -Asymptomatic without angina or anginal equivalents.   -Continue ASA 81 mg daily.   -Resume Lipitor 80 mg daily. When asked if the patient was on this medication he did not know what it was.   -Target LDL < 55. If he is adherent to therapy and not at goal start Zetia 10 mg daily. If he remains not at goal on Lipitor and Zetia start PCSK9i.     Hypertension  -Does not know what blood pressure at home is. Initially hypertensive in clinic. Does not know what Norvasc or Lisinopril is.   -Resume Norvasc 5 mg daily and Lisinopril 2.5 mg daily for now.     Hypercholesterolemia  -Resume Lipitor 80 mg daily. When asked if the patient was on this medication he did not know what it was.   -Target LDL < 55. If he is adherent to therapy and not at goal start Zetia 10 mg daily. If he remains not at goal on Lipitor and Zetia start PCSK9i.     Previous Tobacco Abuse  -Cessation recommended.     THC Abuse  -Cessation recommended.     Return to clinic in 6 months.    Tha Ramos  [facility] Cardiology Chief Fellow, PGY-6  02/06/2025 12:34 PM  [facility]

## 2025-02-07 ENCOUNTER — ANESTHESIA (OUTPATIENT)
Dept: SURGERY | Facility: HOSPITAL | Age: 65
End: 2025-02-07
Payer: MEDICAID

## 2025-02-11 NOTE — OR NURSING
"[facility] NOTE:    Mr Richards is a re-schedule from 2/7/2014. He's back on for 2/14/2025 (Creation, Bypass, Arterial,Fem to Fem). We are not able to confirm what medications he takes/has taken. He is prescribed lisinopril, amlodipine, asa and pletal. I just called patient to verify what medications he takes and he could not read the names off the bottles he had in front of him. I asked him to tell me the first letter and he was not able to do that either. He could only say, "I got 3 bottles of pills, one pill is little, one is big and white, one is yellow." He says he picked up ALL of his medications from the pharmacy yesterday (2/10)  and "took the little one and big white one" while he was there.     He also said his cardiologist did not give him instructions to stop/hold the blood thinners gisell op. My biggest concern is, this patient is a poor historian and we do not know what, if any medications he takes/has been taking. I recall speaking to him back on 1/31/2025 and during that call he told me his was at the pharmacy picking up the "blood thinners."     Attempted to reach patients sister Dyan Nicole (listed as alternate contact) to see if she could help us confirm what medications Mr. Richards takes, spoke to her , he states, "she is not here, I can have her call you back." OPS call back number [phone] or [phone] given to brother-in- law.       MATEO Santana., and Elyse Neville RN in Vascular clinic advised.     2/11/2025 @ 1134: Secure chat from MD Moreno Perez (addressed to myself, Elyse Neville RN, MATEO Santana states:  patient can continue the ASA and Pletal for now. I will contact Dr Winter to see if he should hold and let you know."       "

## 2025-02-12 NOTE — PROGRESS NOTES
Called patient to confirm surgical plan. Patient knows he is coming for surgery Friday 2/14. His daughter helps with all his meds. He will continue to take his home meds and bring a list of all his meds 2/14.    Loly Farrell MD  General Surgery PGY3

## 2025-02-14 ENCOUNTER — HOSPITAL ENCOUNTER (INPATIENT)
Facility: HOSPITAL | Age: 65
LOS: 1 days | Discharge: HOME | DRG: 254 | End: 2025-02-15
Attending: SURGERY | Admitting: SPECIALIST
Payer: MEDICAID

## 2025-02-14 DIAGNOSIS — I73.9 PAD (PERIPHERAL ARTERY DISEASE): ICD-10-CM

## 2025-02-14 DIAGNOSIS — I73.9 PERIPHERAL ARTERIAL DISEASE: ICD-10-CM

## 2025-02-14 PROCEDURE — 63600175 PHARM REV CODE 636 W HCPCS: Mod: JZ,TB | Performed by: ANESTHESIOLOGY

## 2025-02-14 PROCEDURE — 63600175 PHARM REV CODE 636 W HCPCS: Performed by: STUDENT IN AN ORGANIZED HEALTH CARE EDUCATION/TRAINING PROGRAM

## 2025-02-14 PROCEDURE — 04CK0ZZ EXTIRPATION OF MATTER FROM RIGHT FEMORAL ARTERY, OPEN APPROACH: ICD-10-PCS | Performed by: SURGERY

## 2025-02-14 PROCEDURE — 36000709 HC OR TIME LEV III EA ADD 15 MIN: Performed by: SURGERY

## 2025-02-14 PROCEDURE — 041L0JH BYPASS LEFT FEMORAL ARTERY TO RIGHT FEMORAL ARTERY WITH SYNTHETIC SUBSTITUTE, OPEN APPROACH: ICD-10-PCS | Performed by: SURGERY

## 2025-02-14 PROCEDURE — 25000003 PHARM REV CODE 250

## 2025-02-14 PROCEDURE — 20000000 HC ICU ROOM

## 2025-02-14 PROCEDURE — P9045 ALBUMIN (HUMAN), 5%, 250 ML: HCPCS | Mod: JZ,TB | Performed by: NURSE ANESTHETIST, CERTIFIED REGISTERED

## 2025-02-14 PROCEDURE — 27201423 OPTIME MED/SURG SUP & DEVICES STERILE SUPPLY: Performed by: SURGERY

## 2025-02-14 PROCEDURE — 25000003 PHARM REV CODE 250: Performed by: SURGERY

## 2025-02-14 PROCEDURE — 04CL0ZZ EXTIRPATION OF MATTER FROM LEFT FEMORAL ARTERY, OPEN APPROACH: ICD-10-PCS | Performed by: SURGERY

## 2025-02-14 PROCEDURE — 25000003 PHARM REV CODE 250: Performed by: NURSE ANESTHETIST, CERTIFIED REGISTERED

## 2025-02-14 PROCEDURE — 25000242 PHARM REV CODE 250 ALT 637 W/ HCPCS: Performed by: ANESTHESIOLOGY

## 2025-02-14 PROCEDURE — 36000708 HC OR TIME LEV III 1ST 15 MIN: Performed by: SURGERY

## 2025-02-14 PROCEDURE — 37000009 HC ANESTHESIA EA ADD 15 MINS: Performed by: SURGERY

## 2025-02-14 PROCEDURE — 27000221 HC OXYGEN, UP TO 24 HOURS

## 2025-02-14 PROCEDURE — 37000008 HC ANESTHESIA 1ST 15 MINUTES: Performed by: SURGERY

## 2025-02-14 PROCEDURE — 71000033 HC RECOVERY, INTIAL HOUR: Performed by: SURGERY

## 2025-02-14 PROCEDURE — 63600175 PHARM REV CODE 636 W HCPCS: Performed by: NURSE ANESTHETIST, CERTIFIED REGISTERED

## 2025-02-14 PROCEDURE — C1768 GRAFT, VASCULAR: HCPCS | Performed by: SURGERY

## 2025-02-14 PROCEDURE — 63600175 PHARM REV CODE 636 W HCPCS: Performed by: SURGERY

## 2025-02-14 DEVICE — PROPATEN VASCULAR GRAFT TW RR 8MMX80CM 70CM RINGS HEPARIN
Type: IMPLANTABLE DEVICE | Site: GROIN | Status: FUNCTIONAL
Brand: GORE PROPATEN VASCULAR GRAFT

## 2025-02-14 RX ORDER — SODIUM CHLORIDE, SODIUM LACTATE, POTASSIUM CHLORIDE, CALCIUM CHLORIDE 600; 310; 30; 20 MG/100ML; MG/100ML; MG/100ML; MG/100ML
125 INJECTION, SOLUTION INTRAVENOUS CONTINUOUS
Status: DISCONTINUED | OUTPATIENT
Start: 2025-02-14 | End: 2025-02-14

## 2025-02-14 RX ORDER — ASPIRIN 81 MG/1
81 TABLET ORAL DAILY
Qty: 90 TABLET | Refills: 3 | Status: SHIPPED | OUTPATIENT
Start: 2025-02-14 | End: 2025-02-15

## 2025-02-14 RX ORDER — HYDROMORPHONE HYDROCHLORIDE 1 MG/ML
INJECTION, SOLUTION INTRAMUSCULAR; INTRAVENOUS; SUBCUTANEOUS
Status: DISCONTINUED | OUTPATIENT
Start: 2025-02-14 | End: 2025-02-14

## 2025-02-14 RX ORDER — SODIUM CHLORIDE 0.9 % (FLUSH) 0.9 %
10 SYRINGE (ML) INJECTION
Status: DISCONTINUED | OUTPATIENT
Start: 2025-02-14 | End: 2025-02-15 | Stop reason: HOSPADM

## 2025-02-14 RX ORDER — IPRATROPIUM BROMIDE AND ALBUTEROL SULFATE 2.5; .5 MG/3ML; MG/3ML
3 SOLUTION RESPIRATORY (INHALATION) ONCE AS NEEDED
Status: COMPLETED | OUTPATIENT
Start: 2025-02-14 | End: 2025-02-14

## 2025-02-14 RX ORDER — DIPHENHYDRAMINE HYDROCHLORIDE 50 MG/ML
12.5 INJECTION INTRAMUSCULAR; INTRAVENOUS ONCE AS NEEDED
Status: DISCONTINUED | OUTPATIENT
Start: 2025-02-14 | End: 2025-02-14

## 2025-02-14 RX ORDER — CEFAZOLIN SODIUM 1 G/3ML
2 INJECTION, POWDER, FOR SOLUTION INTRAMUSCULAR; INTRAVENOUS
Status: DISCONTINUED | OUTPATIENT
Start: 2025-02-14 | End: 2025-02-14 | Stop reason: HOSPADM

## 2025-02-14 RX ORDER — ONDANSETRON HYDROCHLORIDE 2 MG/ML
INJECTION, SOLUTION INTRAVENOUS
Status: DISCONTINUED | OUTPATIENT
Start: 2025-02-14 | End: 2025-02-14

## 2025-02-14 RX ORDER — HEPARIN SOD,PORCINE/0.9 % NACL 1000/500ML
INTRAVENOUS SOLUTION INTRAVENOUS
Status: DISCONTINUED | OUTPATIENT
Start: 2025-02-14 | End: 2025-02-14 | Stop reason: HOSPADM

## 2025-02-14 RX ORDER — HEPARIN SODIUM 5000 [USP'U]/ML
5000 INJECTION, SOLUTION INTRAVENOUS; SUBCUTANEOUS ONCE
Status: COMPLETED | OUTPATIENT
Start: 2025-02-14 | End: 2025-02-14

## 2025-02-14 RX ORDER — CLOPIDOGREL BISULFATE 75 MG/1
75 TABLET ORAL DAILY
Qty: 30 TABLET | Refills: 11 | Status: SHIPPED | OUTPATIENT
Start: 2025-02-14 | End: 2025-02-14

## 2025-02-14 RX ORDER — OXYCODONE HYDROCHLORIDE 5 MG/1
5 TABLET ORAL EVERY 6 HOURS PRN
Qty: 21 TABLET | Refills: 0 | Status: SHIPPED | OUTPATIENT
Start: 2025-02-14 | End: 2025-02-14

## 2025-02-14 RX ORDER — HEPARIN SODIUM 1000 [USP'U]/ML
INJECTION, SOLUTION INTRAVENOUS; SUBCUTANEOUS
Status: DISCONTINUED | OUTPATIENT
Start: 2025-02-14 | End: 2025-02-14

## 2025-02-14 RX ORDER — AMLODIPINE BESYLATE 5 MG/1
5 TABLET ORAL DAILY
Status: DISCONTINUED | OUTPATIENT
Start: 2025-02-15 | End: 2025-02-15 | Stop reason: HOSPADM

## 2025-02-14 RX ORDER — OXYCODONE HYDROCHLORIDE 5 MG/1
5 TABLET ORAL EVERY 4 HOURS PRN
Status: DISCONTINUED | OUTPATIENT
Start: 2025-02-14 | End: 2025-02-15 | Stop reason: HOSPADM

## 2025-02-14 RX ORDER — LIDOCAINE HYDROCHLORIDE 20 MG/ML
INJECTION INTRAVENOUS
Status: DISCONTINUED | OUTPATIENT
Start: 2025-02-14 | End: 2025-02-14

## 2025-02-14 RX ORDER — ONDANSETRON 4 MG/1
8 TABLET, ORALLY DISINTEGRATING ORAL EVERY 8 HOURS PRN
Status: DISCONTINUED | OUTPATIENT
Start: 2025-02-14 | End: 2025-02-14

## 2025-02-14 RX ORDER — OXYCODONE HYDROCHLORIDE 5 MG/1
10 TABLET ORAL EVERY 4 HOURS PRN
Status: DISCONTINUED | OUTPATIENT
Start: 2025-02-14 | End: 2025-02-15 | Stop reason: HOSPADM

## 2025-02-14 RX ORDER — OXYCODONE AND ACETAMINOPHEN 5; 325 MG/1; MG/1
1 TABLET ORAL
Status: DISCONTINUED | OUTPATIENT
Start: 2025-02-14 | End: 2025-02-14

## 2025-02-14 RX ORDER — ROCURONIUM BROMIDE 10 MG/ML
INJECTION, SOLUTION INTRAVENOUS
Status: DISCONTINUED | OUTPATIENT
Start: 2025-02-14 | End: 2025-02-14

## 2025-02-14 RX ORDER — CLOPIDOGREL BISULFATE 75 MG/1
75 TABLET ORAL DAILY
Status: DISCONTINUED | OUTPATIENT
Start: 2025-02-14 | End: 2025-02-15 | Stop reason: HOSPADM

## 2025-02-14 RX ORDER — NAPROXEN SODIUM 220 MG/1
81 TABLET, FILM COATED ORAL DAILY
Qty: 90 TABLET | Refills: 3 | Status: SHIPPED | OUTPATIENT
Start: 2025-02-14 | End: 2025-02-15 | Stop reason: HOSPADM

## 2025-02-14 RX ORDER — CLOPIDOGREL BISULFATE 75 MG/1
75 TABLET ORAL DAILY
Qty: 30 TABLET | Refills: 11 | Status: SHIPPED | OUTPATIENT
Start: 2025-02-14 | End: 2025-02-15

## 2025-02-14 RX ORDER — PHENYLEPHRINE HYDROCHLORIDE 10 MG/ML
INJECTION INTRAVENOUS
Status: DISCONTINUED | OUTPATIENT
Start: 2025-02-14 | End: 2025-02-14

## 2025-02-14 RX ORDER — NAPROXEN SODIUM 220 MG/1
81 TABLET, FILM COATED ORAL DAILY
Status: DISCONTINUED | OUTPATIENT
Start: 2025-02-14 | End: 2025-02-15 | Stop reason: HOSPADM

## 2025-02-14 RX ORDER — ACETAMINOPHEN 325 MG/1
650 TABLET ORAL EVERY 4 HOURS PRN
Status: DISCONTINUED | OUTPATIENT
Start: 2025-02-14 | End: 2025-02-14

## 2025-02-14 RX ORDER — OXYCODONE HYDROCHLORIDE 5 MG/1
5 TABLET ORAL EVERY 6 HOURS PRN
Qty: 21 TABLET | Refills: 0 | Status: SHIPPED | OUTPATIENT
Start: 2025-02-14 | End: 2025-02-15 | Stop reason: HOSPADM

## 2025-02-14 RX ORDER — LIDOCAINE HYDROCHLORIDE 10 MG/ML
1 INJECTION, SOLUTION EPIDURAL; INFILTRATION; INTRACAUDAL; PERINEURAL ONCE AS NEEDED
Status: DISCONTINUED | OUTPATIENT
Start: 2025-02-14 | End: 2025-02-15 | Stop reason: HOSPADM

## 2025-02-14 RX ORDER — ALBUMIN HUMAN 50 G/1000ML
SOLUTION INTRAVENOUS
Status: DISCONTINUED | OUTPATIENT
Start: 2025-02-14 | End: 2025-02-14

## 2025-02-14 RX ORDER — FENTANYL CITRATE 50 UG/ML
INJECTION, SOLUTION INTRAMUSCULAR; INTRAVENOUS
Status: DISCONTINUED | OUTPATIENT
Start: 2025-02-14 | End: 2025-02-14

## 2025-02-14 RX ORDER — KETOROLAC TROMETHAMINE 30 MG/ML
30 INJECTION, SOLUTION INTRAMUSCULAR; INTRAVENOUS ONCE AS NEEDED
Status: DISCONTINUED | OUTPATIENT
Start: 2025-02-14 | End: 2025-02-14

## 2025-02-14 RX ORDER — DEXAMETHASONE SODIUM PHOSPHATE 4 MG/ML
INJECTION, SOLUTION INTRA-ARTICULAR; INTRALESIONAL; INTRAMUSCULAR; INTRAVENOUS; SOFT TISSUE
Status: DISCONTINUED | OUTPATIENT
Start: 2025-02-14 | End: 2025-02-14

## 2025-02-14 RX ORDER — PROTAMINE SULFATE 10 MG/ML
INJECTION, SOLUTION INTRAVENOUS
Status: DISCONTINUED | OUTPATIENT
Start: 2025-02-14 | End: 2025-02-14

## 2025-02-14 RX ORDER — ONDANSETRON 4 MG/1
8 TABLET, ORALLY DISINTEGRATING ORAL EVERY 8 HOURS PRN
Status: DISCONTINUED | OUTPATIENT
Start: 2025-02-14 | End: 2025-02-15 | Stop reason: HOSPADM

## 2025-02-14 RX ORDER — PROPOFOL 10 MG/ML
VIAL (ML) INTRAVENOUS
Status: DISCONTINUED | OUTPATIENT
Start: 2025-02-14 | End: 2025-02-14

## 2025-02-14 RX ORDER — HYDROMORPHONE HYDROCHLORIDE 1 MG/ML
0.4 INJECTION, SOLUTION INTRAMUSCULAR; INTRAVENOUS; SUBCUTANEOUS EVERY 10 MIN PRN
Status: DISCONTINUED | OUTPATIENT
Start: 2025-02-14 | End: 2025-02-14

## 2025-02-14 RX ORDER — HYDROMORPHONE HYDROCHLORIDE 1 MG/ML
0.2 INJECTION, SOLUTION INTRAMUSCULAR; INTRAVENOUS; SUBCUTANEOUS EVERY 4 HOURS PRN
Status: DISCONTINUED | OUTPATIENT
Start: 2025-02-14 | End: 2025-02-15 | Stop reason: HOSPADM

## 2025-02-14 RX ORDER — SODIUM CHLORIDE 9 MG/ML
INJECTION, SOLUTION INTRAVENOUS
Status: COMPLETED | OUTPATIENT
Start: 2025-02-14 | End: 2025-02-14

## 2025-02-14 RX ORDER — TALC
6 POWDER (GRAM) TOPICAL NIGHTLY PRN
Status: DISCONTINUED | OUTPATIENT
Start: 2025-02-14 | End: 2025-02-15 | Stop reason: HOSPADM

## 2025-02-14 RX ORDER — ONDANSETRON HYDROCHLORIDE 2 MG/ML
4 INJECTION, SOLUTION INTRAVENOUS ONCE AS NEEDED
Status: DISCONTINUED | OUTPATIENT
Start: 2025-02-14 | End: 2025-02-14

## 2025-02-14 RX ORDER — ATORVASTATIN CALCIUM 40 MG/1
80 TABLET, FILM COATED ORAL NIGHTLY
Status: DISCONTINUED | OUTPATIENT
Start: 2025-02-15 | End: 2025-02-15 | Stop reason: HOSPADM

## 2025-02-14 RX ORDER — ACETAMINOPHEN 325 MG/1
650 TABLET ORAL EVERY 8 HOURS PRN
Status: DISCONTINUED | OUTPATIENT
Start: 2025-02-14 | End: 2025-02-15 | Stop reason: HOSPADM

## 2025-02-14 RX ORDER — GLUCAGON 1 MG
1 KIT INJECTION
Status: DISCONTINUED | OUTPATIENT
Start: 2025-02-14 | End: 2025-02-14

## 2025-02-14 RX ADMIN — HYDROMORPHONE HYDROCHLORIDE 0.4 MG: 1 INJECTION, SOLUTION INTRAMUSCULAR; INTRAVENOUS; SUBCUTANEOUS at 01:02

## 2025-02-14 RX ADMIN — PHENYLEPHRINE HYDROCHLORIDE 100 MCG: 10 INJECTION INTRAVENOUS at 11:02

## 2025-02-14 RX ADMIN — PROPOFOL 150 MG: 10 INJECTION, EMULSION INTRAVENOUS at 09:02

## 2025-02-14 RX ADMIN — PHENYLEPHRINE HYDROCHLORIDE 100 MCG: 10 INJECTION INTRAVENOUS at 12:02

## 2025-02-14 RX ADMIN — CLOPIDOGREL BISULFATE 75 MG: 75 TABLET ORAL at 04:02

## 2025-02-14 RX ADMIN — ALBUMIN (HUMAN) 250 ML: 12.5 INJECTION, SOLUTION INTRAVENOUS at 11:02

## 2025-02-14 RX ADMIN — CEFAZOLIN 2 G: 330 INJECTION, POWDER, FOR SOLUTION INTRAMUSCULAR; INTRAVENOUS at 10:02

## 2025-02-14 RX ADMIN — LIDOCAINE HYDROCHLORIDE 80 MG: 20 INJECTION INTRAVENOUS at 09:02

## 2025-02-14 RX ADMIN — ROCURONIUM BROMIDE 20 MG: 10 INJECTION INTRAVENOUS at 11:02

## 2025-02-14 RX ADMIN — LIDOCAINE HYDROCHLORIDE 100 MG: 20 INJECTION INTRAVENOUS at 12:02

## 2025-02-14 RX ADMIN — ONDANSETRON 4 MG: 2 INJECTION INTRAMUSCULAR; INTRAVENOUS at 12:02

## 2025-02-14 RX ADMIN — FENTANYL CITRATE 25 MCG: 50 INJECTION INTRAMUSCULAR; INTRAVENOUS at 10:02

## 2025-02-14 RX ADMIN — HEPARIN SODIUM 7000 UNITS: 1000 INJECTION, SOLUTION INTRAVENOUS; SUBCUTANEOUS at 10:02

## 2025-02-14 RX ADMIN — PHENYLEPHRINE HYDROCHLORIDE 200 MCG: 10 INJECTION INTRAVENOUS at 10:02

## 2025-02-14 RX ADMIN — OXYCODONE 5 MG: 5 TABLET ORAL at 04:02

## 2025-02-14 RX ADMIN — FENTANYL CITRATE 50 MCG: 50 INJECTION INTRAMUSCULAR; INTRAVENOUS at 11:02

## 2025-02-14 RX ADMIN — PROTAMINE SULFATE 25 MG: 10 INJECTION, SOLUTION INTRAVENOUS at 12:02

## 2025-02-14 RX ADMIN — FENTANYL CITRATE 25 MCG: 50 INJECTION INTRAMUSCULAR; INTRAVENOUS at 09:02

## 2025-02-14 RX ADMIN — SODIUM CHLORIDE, POTASSIUM CHLORIDE, SODIUM LACTATE AND CALCIUM CHLORIDE: 600; 310; 30; 20 INJECTION, SOLUTION INTRAVENOUS at 09:02

## 2025-02-14 RX ADMIN — IPRATROPIUM BROMIDE AND ALBUTEROL SULFATE 3 ML: .5; 3 SOLUTION RESPIRATORY (INHALATION) at 01:02

## 2025-02-14 RX ADMIN — SUGAMMADEX 200 MG: 100 INJECTION, SOLUTION INTRAVENOUS at 12:02

## 2025-02-14 RX ADMIN — DEXAMETHASONE SODIUM PHOSPHATE 4 MG: 4 INJECTION, SOLUTION INTRA-ARTICULAR; INTRALESIONAL; INTRAMUSCULAR; INTRAVENOUS; SOFT TISSUE at 10:02

## 2025-02-14 RX ADMIN — ASPIRIN 81 MG CHEWABLE TABLET 81 MG: 81 TABLET CHEWABLE at 04:02

## 2025-02-14 RX ADMIN — ROCURONIUM BROMIDE 50 MG: 10 INJECTION INTRAVENOUS at 10:02

## 2025-02-14 RX ADMIN — PHENYLEPHRINE HYDROCHLORIDE 100 MCG: 10 INJECTION INTRAVENOUS at 10:02

## 2025-02-14 RX ADMIN — HYDROMORPHONE HYDROCHLORIDE 0.5 MG: 1 INJECTION, SOLUTION INTRAMUSCULAR; INTRAVENOUS; SUBCUTANEOUS at 12:02

## 2025-02-14 RX ADMIN — PROPOFOL 50 MG: 10 INJECTION, EMULSION INTRAVENOUS at 09:02

## 2025-02-14 RX ADMIN — HEPARIN SODIUM 5000 UNITS: 5000 INJECTION INTRAVENOUS; SUBCUTANEOUS at 08:02

## 2025-02-14 NOTE — OP NOTE
[facility] - Periop Services  Operative Note    SUMMARY     Surgery Date: 2/14/2025     Surgeons and Role:     * David Winter MD - Primary  Sheeba Perez MD     * Loly Farrell MD - Resident - Assisting        Pre-op Diagnosis:  * No pre-op diagnosis entered *    Post-op Diagnosis:  Post-Op Diagnosis Codes:     * Peripheral arterial disease [I73.9]    Procedure(s) (LRB):  CREATION, BYPASS, ARTERIAL, FEMORAL TO FEMORAL, USING GRAFT (N/A)  Left to right femoral bypass  Bilateral common femoral endarterectomy    Anesthesia: General    Implants:  Implant Name Type Inv. Item Serial No.  Lot No. LRB No. Used Action   GRAFT PROPATEN 8 X 80 - [id number]  GRAFT PROPATEN 8 X 80 [id number] [employer]  Bilateral 1 Implanted       Operative Findings: left to right femoral bypass performed, plaque in bilateral common femoral arteries, PTFE graft used, bounding bilateral femoral pulses at end of case, bilateral PT and DP multiphasic on doppler    Technique:   Informed consent was obtained. The patient was placed on the operating room table in a supine position. General anesthetic was induced. The bilateral groins were prepped and draped in the sterile fashion. Standard timeout was performed. Bilateral vertical incisions were made over the bilateral femoral arteries. The fascia and lymphatic tissue were bisected. The common femoral, superficial femoral artery, and profunda were isolated with vessel loops. A tunnel was created subcutaneously between both groin incisions. The PTFE graft was passed through the tunnel. The patient was given 7000 units of heparin. The common femoral, profunda, and SFA were clamped on the donor side. An arteriotomy was made in the common femoral artery toward the profunda. Plaque was removed. The graft was cut and an anastomosis was made with prolene. Before completion of the anastomosis, the lumen was irrigated with heparinized saline. Blood was allowed to back-bleed. The  anastomosis was completed. An arteriotomy was made on the right common femoral artery. The anastomosis was created between the graft and artery with prolene suture.  Bilateral femoral arteries had bounding pulses. Protamine was administered. Hemostasis was obtained in both groins and a multilayer closure was performed with vicryl suture. The skin was closed with monocryl and dermabond applied. 2+ pulses at the end of case in femoral artery with multiphasic DP and PT bilaterally. Patient tolerated the procedure without complications, left the operating room to go to the recovery room in satisfactory. Dr. Winter present for entire case.    Estimated Blood Loss: 100mL           Specimens:   Specimen (24h ago, onward)      None            [id number]

## 2025-02-14 NOTE — ANESTHESIA POSTPROCEDURE EVALUATION
Anesthesia Post Evaluation    Patient: ABENA Richards    Procedure(s) Performed: Procedure(s) (LRB):  CREATION, BYPASS, ARTERIAL, FEMORAL TO FEMORAL, USING GRAFT (N/A)    Final Anesthesia Type: general      Patient location during evaluation: PACU  Patient participation: Yes- Able to Participate  Level of consciousness: awake and alert and oriented  Post-procedure vital signs: reviewed and stable  Pain management: adequate  Airway patency: patent    PONV status at discharge: No PONV  Anesthetic complications: no      Cardiovascular status: blood pressure returned to baseline  Respiratory status: unassisted, spontaneous ventilation and room air  Hydration status: euvolemic  Follow-up not needed.              Vitals Value Taken Time   /63 02/14/25 1702   Temp 36.9 °C (98.4 °F) 02/14/25 1600   Pulse 90 02/14/25 1703   Resp 12 02/14/25 1703   SpO2 91 % 02/14/25 1703   Vitals shown include unfiled device data.      Event Time   Out of Recovery 14:28:00         Pain/Antelmo Score: Pain Rating Prior to Med Admin: 5 (2/14/2025  4:42 PM)  Antelmo Score: 8 (2/14/2025  1:27 PM)

## 2025-02-14 NOTE — ANESTHESIA PROCEDURE NOTES
Intubation    Date/Time: 2/14/2025 10:13 AM    Performed by: Crystal Hodge CRNA  Authorized by: Ashutosh Mckeon MD    Intubation:     Induction:  Intravenous    Intubated:  Postinduction    Mask Ventilation:  Easy with oral airway    Attempts:  1    Attempted By:  CRNA    Method of Intubation:  Direct    Blade:  Roma 4    Laryngeal View Grade: Grade IIA - cords partially seen      Difficult Airway Encountered?: No      Complications:  None    Airway Device:  Oral endotracheal tube    Airway Device Size:  7.5    Style/Cuff Inflation:  Cuffed (inflated to minimal occlusive pressure)    Inflation Amount (mL):  7    Tube secured:  22    Secured at:  The lips    Placement Verified By:  Capnometry    Complicating Factors:  None    Findings Post-Intubation:  BS equal bilateral and atraumatic/condition of teeth unchanged

## 2025-02-14 NOTE — ANESTHESIA PROCEDURE NOTES
Arterial    Diagnosis: vascular disease    Patient location during procedure: done in OR  Timeout: 2/14/2025 10:10 AM  Procedure end time: 2/14/2025 10:16 AM    Staffing  Authorizing Provider: Ashutosh Mckeon MD  Performing Provider: Ashutosh Mckeon MD    Staffing  Performed by: Ashutosh Mckeon MD  Authorized by: Ashutosh Mckeon MD    Anesthesiologist was present at the time of the procedure.    Preanesthetic Checklist  Completed: patient identified, IV checked, site marked, risks and benefits discussed, surgical consent, monitors and equipment checked, pre-op evaluation, timeout performed and anesthesia consent givenArterial  Local Infiltration: lidocaine and none  Orientation: left  Location: radial    Catheter Size: 20 G  Catheter placement by Anatomical landmarks. Heme positive aspiration all ports. Insertion Attempts: 1  Assessment  Dressing: secured with tape and tegaderm  Patient: Tolerated well

## 2025-02-14 NOTE — ANESTHESIA PROCEDURE NOTES
Intubation    Date/Time: 2/14/2025 9:56 AM    Performed by: Crystal Hodge CRNA  Authorized by: Ashutosh Mckeon MD    Intubation:     Induction:  Intravenous    Intubated:  Postinduction    Mask Ventilation:  Not attempted    Attempts:  1    Attempted By:  CRNA    Difficult Airway Encountered?: No      Complications:  None    Airway Device:  Supraglottic airway/LMA    Airway Device Size:  4.0    Style/Cuff Inflation:  Uncuffed    Placement Verified By:  Capnometry    Complicating Factors:  None    Findings Post-Intubation:  BS equal bilateral and atraumatic/condition of teeth unchanged

## 2025-02-14 NOTE — PLAN OF CARE
02/14/25 1551   Discharge Assessment   Assessment Type Discharge Planning Assessment   Source of Information family;health record   When was your last doctors appointment?   (Thais Maldonado)   Reason For Admission Peripheral arterial disease   Facility Arrived From: Vascular Clinic   Do you expect to return to your current living situation? Yes   Do you have help at home or someone to help you manage your care at home? Yes   Who are your caregiver(s) and their phone number(s)? GARRISON NICOLE (Sister)  [phone]   Prior to hospitilization cognitive status: Unable to Assess   Current cognitive status: Unable to Assess   Readmission within 30 days? No   Do you take prescription medications? Yes  ([facility] [address] [facility]   Do you have prescription coverage? Yes   Coverage [employer] M/D   Do you have any problems affording any of your prescribed medications? No   Who is going to help you get home at discharge? Family   Discharge Plan A Home with family   DME Needed Upon Discharge  none   Transition of Care Barriers None     Pt asleep upon return from surgery; Left message for emergency contact, sister Garrison Nicole ([phone]) requesting return call to complete d/c planning assessment; CM to follow.

## 2025-02-14 NOTE — INTERVAL H&P NOTE
The patient has been examined and the H&P has been reviewed:    I concur with the findings and changes have been noted since the H&P was written: Unable to doppler R DP/PT in preop, still no rest pain, pt reports stable claudication symptoms.    Surgery risks, benefits and alternative options discussed and understood by patient/family.    Took AM meds, NPO since midnight      There are no hospital problems to display for this patient.

## 2025-02-14 NOTE — TRANSFER OF CARE
Anesthesia Transfer of Care Note    Patient: ABENA Richards    Procedure(s) Performed: Procedure(s) (LRB):  CREATION, BYPASS, ARTERIAL, FEMORAL TO FEMORAL, USING GRAFT (N/A)    Patient location: PACU    Anesthesia Type: general    Transport from OR: Transported from OR on room air with adequate spontaneous ventilation    Post pain: adequate analgesia    Post assessment: no apparent anesthetic complications and tolerated procedure well    Post vital signs: stable    Level of consciousness: sedated    Nausea/Vomiting: no nausea/vomiting    Complications: none    Transfer of care protocol was followed

## 2025-02-15 VITALS
SYSTOLIC BLOOD PRESSURE: 126 MMHG | BODY MASS INDEX: 21.39 KG/M2 | OXYGEN SATURATION: 94 % | TEMPERATURE: 98 F | HEIGHT: 68 IN | WEIGHT: 141.13 LBS | RESPIRATION RATE: 29 BRPM | DIASTOLIC BLOOD PRESSURE: 93 MMHG | HEART RATE: 109 BPM

## 2025-02-15 LAB
ALBUMIN SERPL-MCNC: 3.4 G/DL (ref 3.4–4.8)
ALBUMIN/GLOB SERPL: 0.9 RATIO (ref 1.1–2)
ALP SERPL-CCNC: 84 UNIT/L (ref 40–150)
ALT SERPL-CCNC: 11 UNIT/L (ref 0–55)
ANION GAP SERPL CALC-SCNC: 8 MEQ/L
ANISOCYTOSIS BLD QL SMEAR: ABNORMAL
AST SERPL-CCNC: 18 UNIT/L (ref 5–34)
BASOPHILS # BLD AUTO: 0.03 X10(3)/MCL
BASOPHILS NFR BLD AUTO: 0.2 %
BILIRUB SERPL-MCNC: 0.7 MG/DL
BUN SERPL-MCNC: 15.7 MG/DL (ref 8.4–25.7)
CALCIUM SERPL-MCNC: 8.8 MG/DL (ref 8.8–10)
CHLORIDE SERPL-SCNC: 108 MMOL/L (ref 98–107)
CO2 SERPL-SCNC: 17 MMOL/L (ref 23–31)
CREAT SERPL-MCNC: 1.2 MG/DL (ref 0.72–1.25)
CREAT/UREA NIT SERPL: 13
ELLIPTOCYTOSIS (OHS): SLIGHT
EOSINOPHIL # BLD AUTO: 0 X10(3)/MCL (ref 0–0.9)
EOSINOPHIL NFR BLD AUTO: 0 %
ERYTHROCYTE [DISTWIDTH] IN BLOOD BY AUTOMATED COUNT: 23.1 % (ref 11.5–17)
GFR SERPLBLD CREATININE-BSD FMLA CKD-EPI: >60 ML/MIN/1.73/M2
GLOBULIN SER-MCNC: 3.7 GM/DL (ref 2.4–3.5)
GLUCOSE SERPL-MCNC: 133 MG/DL (ref 82–115)
HCT VFR BLD AUTO: 28.4 % (ref 42–52)
HGB BLD-MCNC: 8.1 G/DL (ref 14–18)
HYPOCHROMIA BLD QL SMEAR: ABNORMAL
IMM GRANULOCYTES # BLD AUTO: 0.04 X10(3)/MCL (ref 0–0.04)
IMM GRANULOCYTES NFR BLD AUTO: 0.3 %
LYMPHOCYTES # BLD AUTO: 1.66 X10(3)/MCL (ref 0.6–4.6)
LYMPHOCYTES NFR BLD AUTO: 11.6 %
MAGNESIUM SERPL-MCNC: 1.8 MG/DL (ref 1.6–2.6)
MCH RBC QN AUTO: 18.1 PG (ref 27–31)
MCHC RBC AUTO-ENTMCNC: 28.5 G/DL (ref 33–36)
MCV RBC AUTO: 63.4 FL (ref 80–94)
MONOCYTES # BLD AUTO: 1.47 X10(3)/MCL (ref 0.1–1.3)
MONOCYTES NFR BLD AUTO: 10.3 %
NEUTROPHILS # BLD AUTO: 11.05 X10(3)/MCL (ref 2.1–9.2)
NEUTROPHILS NFR BLD AUTO: 77.6 %
NRBC BLD AUTO-RTO: 0 %
PHOSPHATE SERPL-MCNC: 3.6 MG/DL (ref 2.3–4.7)
PLATELET # BLD AUTO: 340 X10(3)/MCL (ref 130–400)
PLATELET # BLD EST: ADEQUATE 10*3/UL
PMV BLD AUTO: 8.9 FL (ref 7.4–10.4)
POTASSIUM SERPL-SCNC: 4.3 MMOL/L (ref 3.5–5.1)
PROT SERPL-MCNC: 7.1 GM/DL (ref 5.8–7.6)
RBC # BLD AUTO: 4.48 X10(6)/MCL (ref 4.7–6.1)
SCHISTOCYTE (OLG): SLIGHT
SODIUM SERPL-SCNC: 133 MMOL/L (ref 136–145)
WBC # BLD AUTO: 14.25 X10(3)/MCL (ref 4.5–11.5)

## 2025-02-15 PROCEDURE — 85025 COMPLETE CBC W/AUTO DIFF WBC: CPT

## 2025-02-15 PROCEDURE — 94761 N-INVAS EAR/PLS OXIMETRY MLT: CPT

## 2025-02-15 PROCEDURE — 27000221 HC OXYGEN, UP TO 24 HOURS

## 2025-02-15 PROCEDURE — 80053 COMPREHEN METABOLIC PANEL: CPT

## 2025-02-15 PROCEDURE — 36415 COLL VENOUS BLD VENIPUNCTURE: CPT

## 2025-02-15 PROCEDURE — 84100 ASSAY OF PHOSPHORUS: CPT

## 2025-02-15 PROCEDURE — 25000003 PHARM REV CODE 250

## 2025-02-15 PROCEDURE — 83735 ASSAY OF MAGNESIUM: CPT

## 2025-02-15 RX ORDER — CLOPIDOGREL BISULFATE 75 MG/1
75 TABLET ORAL DAILY
Qty: 30 TABLET | Refills: 11 | Status: SHIPPED | OUTPATIENT
Start: 2025-02-15 | End: 2026-02-15

## 2025-02-15 RX ORDER — ASPIRIN 81 MG/1
81 TABLET ORAL DAILY
Qty: 90 TABLET | Refills: 3 | Status: SHIPPED | OUTPATIENT
Start: 2025-02-15 | End: 2026-02-15

## 2025-02-15 RX ORDER — TRAMADOL HYDROCHLORIDE 50 MG/1
50 TABLET ORAL EVERY 6 HOURS PRN
Qty: 60 TABLET | Refills: 1 | Status: SHIPPED | OUTPATIENT
Start: 2025-02-15 | End: 2025-02-15

## 2025-02-15 RX ORDER — TRAMADOL HYDROCHLORIDE 50 MG/1
50 TABLET ORAL EVERY 6 HOURS PRN
Qty: 15 TABLET | Refills: 1 | Status: SHIPPED | OUTPATIENT
Start: 2025-02-15

## 2025-02-15 RX ADMIN — ASPIRIN 81 MG CHEWABLE TABLET 81 MG: 81 TABLET CHEWABLE at 09:02

## 2025-02-15 RX ADMIN — AMLODIPINE BESYLATE 5 MG: 5 TABLET ORAL at 09:02

## 2025-02-15 RX ADMIN — CLOPIDOGREL BISULFATE 75 MG: 75 TABLET ORAL at 09:02

## 2025-02-15 NOTE — DISCHARGE SUMMARY
[facility] - ICU  General Surgery  Discharge Summary      Patient Name: ABENA Richards  MRN: [id number]  Admission Date: 2/14/2025  Hospital Length of Stay: 1 days  Discharge Date and Time:  02/15/2025   Attending Physician: David Winter MD  Discharging Provider: Loly Farrell MD  Primary Care Provider: Thais Maldonado FNP     HPI: 64M PMHx HTN, HLD, PAD with right lower extremity claudication with FRED and EIA occlusion presenting 2/14 for elective left to right femoral bypass    Procedure(s) (LRB):  CREATION, BYPASS, ARTERIAL, FEMORAL TO FEMORAL, USING GRAFT (N/A)     Hospital Course: Patient tolerated surgery well. He was admitted to the ICU postoperatively. He did well overnight. He is ambulating well and tolerating PO. Oxygen was weaned overnight. He is now ready for discharge. Started on plavix this admission    Physical Exam:  General: NAD  Pulm: breathing comfortably  CVS: RRR  GI: abdomen soft, NT, ND  Vascular: 2+ palpable femoral pulses with groin incisions c/d/I. Palpable DP bilaterally with multiphasic PT bilaterally    Consults:   N/a   Significant Diagnostic Studies: N/A    Pending Diagnostic Studies:       None          Final Active Diagnoses:    Diagnosis Date Noted POA    PRINCIPAL PROBLEM:  Claudication [I73.9] 06/15/2022 Yes      Problems Resolved During this Admission:      Discharged Condition: good    Disposition: Home or Self Care    Follow Up: 2/25    Patient Instructions:   No discharge procedures on file.  Medications:  Reconciled Home Medications:      Medication List        START taking these medications      clopidogreL 75 mg tablet  Commonly known as: PLAVIX  Take 1 tablet (75 mg total) by mouth once daily.     traMADoL 50 mg tablet  Commonly known as: ULTRAM  Take 1 tablet (50 mg total) by mouth every 6 (six) hours as needed for Pain. Take 25 to 50 mg daily every 6 hours prn pain            CONTINUE taking these medications      amLODIPine 5 MG tablet  Commonly known as:  NORVASC  Take 1 tablet (5 mg total) by mouth once daily.     aspirin 81 MG EC tablet  Commonly known as: ECOTRIN  Take 1 tablet (81 mg total) by mouth once daily.     atorvastatin 80 MG tablet  Commonly known as: LIPITOR  Take 1 tablet (80 mg total) by mouth every evening.     cilostazoL 50 MG Tab  Commonly known as: PLETAL  Take 1 tablet (50 mg total) by mouth 2 (two) times daily.     lisinopriL 2.5 MG tablet  Commonly known as: PRINIVIL,ZESTRIL  Take 1 tablet (2.5 mg total) by mouth once daily.            STOP taking these medications      gabapentin 300 MG capsule  Commonly known as: NEURONTIN            ASK your doctor about these medications      potassium chloride SA 20 MEQ tablet  Commonly known as: K-DUR,KLOR-CON  Take 1 tablet (20 mEq total) by mouth 2 (two) times daily.              Loly Farrell MD  General Surgery  [facility] - ICU

## 2025-02-25 ENCOUNTER — OFFICE VISIT (OUTPATIENT)
Dept: VASCULAR SURGERY | Facility: CLINIC | Age: 65
End: 2025-02-25
Payer: MEDICAID

## 2025-02-25 VITALS
DIASTOLIC BLOOD PRESSURE: 72 MMHG | TEMPERATURE: 98 F | OXYGEN SATURATION: 98 % | HEART RATE: 86 BPM | WEIGHT: 134.38 LBS | SYSTOLIC BLOOD PRESSURE: 112 MMHG | BODY MASS INDEX: 21.09 KG/M2 | HEIGHT: 67 IN | RESPIRATION RATE: 20 BRPM

## 2025-02-25 DIAGNOSIS — I73.9 PAD (PERIPHERAL ARTERY DISEASE): Primary | ICD-10-CM

## 2025-02-25 PROCEDURE — 99214 OFFICE O/P EST MOD 30 MIN: CPT | Mod: PBBFAC

## 2025-02-25 NOTE — PROGRESS NOTES
Pt seen by Dr. Lynn; Pt instructed to return to clinic in 3 months w/ABIs; Discharge paperwork given w/pt verbalizing understanding

## 2025-02-26 NOTE — PROGRESS NOTES
[facility] General Surgery Clinic Note    HPI: ABENA Richards, 64 y.o. male, pmhx HTN, HLD presents to vascular clinic for postop follow-up status post left to right fem-fem bypass on 02/14/2025.  He reports significantly improved right claudication.  He still intermittently experiences claudication bilaterally while active and cleaning around the house, however he feels his pain is far better.  He denies any tenderness redness drainage from the incision sites, and he is healing well.    PMH:   Past Medical History:   Diagnosis Date    Hyperlipidemia     Hypertension     Hypokalemia     from FNP notes    PAD (peripheral artery disease)     from FNP notes      Meds: Current Medications[1]  Allergies: Review of patient's allergies indicates:  No Known Allergies  Social History: Social History[2]  Family History:   Family History   Problem Relation Name Age of Onset    Hypertension Mother       Surgical History:   Past Surgical History:   Procedure Laterality Date    CREATION OF FEMORAL-FEMORAL ARTERY BYPASS WITH GRAFT N/A 2/14/2025    Procedure: CREATION, BYPASS, ARTERIAL, FEMORAL TO FEMORAL, USING GRAFT;  Surgeon: David Winter MD;  Location: [facility];  Service: Peripheral Vascular;  Laterality: N/A;  left to right fem fem bypass    ENDARTERECTOMY, COMMON FEMORAL Bilateral 2/14/2025    Procedure: ENDARTERECTOMY, COMMON FEMORAL;  Surgeon: David Winter MD;  Location: [facility];  Service: Peripheral Vascular;  Laterality: Bilateral;    FINGER AMPUTATION Left     5th digit     Review of Systems:  Negative except for HPI    Objective:    Vitals:  Vitals:    02/25/25 1400   BP: 112/72   Pulse: 86   Resp: 20   Temp: 98 °F (36.7 °C)        Physical Exam:  Gen: NAD  Neuro: awake, alert, answering questions appropriately  CV: RRR  Resp: non-labored breathing, ELVIA  Abd: soft, ND, NT  :  Not performed  Ext: moves all 4 spontaneously and purposefully  Skin: warm, well perfused  Vascular:  Bypass patent with doppler signals  RLE:   "Palpable femoral, DP/PT, motor and sensation intact, groin incision CDI  LLE: Palpable femoral, DP/PT, motor and sensation intact, groin incision CDI      Assessment/Plan:  64-year-old male status post left-to-right fem-fem bypass on 2/14 doing well postoperatively.  His incisions are healing well and his claudication is significantly improved.      - RTC in 3 months with ABIs    Moreno Lynn MD   [employer] General Surgery, PGY-1           [1]   Current Outpatient Medications:     amLODIPine (NORVASC) 5 MG tablet, Take 1 tablet (5 mg total) by mouth once daily., Disp: 90 tablet, Rfl: 1    aspirin (ECOTRIN) 81 MG EC tablet, Take 1 tablet (81 mg total) by mouth once daily., Disp: 90 tablet, Rfl: 3    atorvastatin (LIPITOR) 80 MG tablet, Take 1 tablet (80 mg total) by mouth every evening., Disp: 90 tablet, Rfl: 1    cilostazoL (PLETAL) 50 MG Tab, Take 1 tablet (50 mg total) by mouth 2 (two) times daily., Disp: 180 tablet, Rfl: 1    clopidogreL (PLAVIX) 75 mg tablet, Take 1 tablet (75 mg total) by mouth once daily., Disp: 30 tablet, Rfl: 11    lisinopriL (PRINIVIL,ZESTRIL) 2.5 MG tablet, Take 1 tablet (2.5 mg total) by mouth once daily., Disp: 90 tablet, Rfl: 1    traMADoL (ULTRAM) 50 mg tablet, Take 1 tablet (50 mg total) by mouth every 6 (six) hours as needed for Pain. Take 25 to 50 mg daily every 6 hours prn pain, Disp: 15 tablet, Rfl: 1    potassium chloride SA (K-DUR,KLOR-CON) 20 MEQ tablet, Take 1 tablet (20 mEq total) by mouth 2 (two) times daily. (Patient not taking: Reported on 2/25/2025), Disp: 180 tablet, Rfl: 2  [2]   Social History  Tobacco Use    Smoking status: Former     Types: Vaping with nicotine    Smokeless tobacco: Never   Substance Use Topics    Alcohol use: Not Currently     Comment: stopped 5-10 years ago    Drug use: Yes     Types: Marijuana     Comment: "I smoke a lot" everyday     "

## 2025-05-02 ENCOUNTER — HOSPITAL ENCOUNTER (OUTPATIENT)
Dept: RADIOLOGY | Facility: HOSPITAL | Age: 65
Discharge: HOME | End: 2025-05-02
Payer: MEDICAID

## 2025-05-02 DIAGNOSIS — Z87.891 HISTORY OF TOBACCO ABUSE: ICD-10-CM

## 2025-05-02 PROCEDURE — 71271 CT THORAX LUNG CANCER SCR C-: CPT | Mod: TC

## 2025-05-06 ENCOUNTER — RESULTS FOLLOW-UP (OUTPATIENT)
Dept: INTERNAL MEDICINE | Facility: CLINIC | Age: 65
End: 2025-05-06

## 2025-06-17 ENCOUNTER — OFFICE VISIT (OUTPATIENT)
Dept: VASCULAR SURGERY | Facility: CLINIC | Age: 65
End: 2025-06-17
Payer: MEDICAID

## 2025-06-17 VITALS
BODY MASS INDEX: 22.13 KG/M2 | TEMPERATURE: 98 F | HEIGHT: 67 IN | HEART RATE: 76 BPM | SYSTOLIC BLOOD PRESSURE: 122 MMHG | DIASTOLIC BLOOD PRESSURE: 69 MMHG | WEIGHT: 141 LBS | OXYGEN SATURATION: 95 % | RESPIRATION RATE: 18 BRPM

## 2025-06-17 DIAGNOSIS — Z95.828 S/P FEMORAL-FEMORAL BYPASS SURGERY: ICD-10-CM

## 2025-06-17 DIAGNOSIS — I73.9 PAD (PERIPHERAL ARTERY DISEASE): Primary | ICD-10-CM

## 2025-06-17 PROCEDURE — 99214 OFFICE O/P EST MOD 30 MIN: CPT | Mod: PBBFAC

## 2025-06-17 NOTE — PROGRESS NOTES
Patient seen in clinic by Dr Bettencourt.  Will have patient RTC in 3 months with ABIs.  Patient came into clinic with facial swelling and stated that it is from one of his medications.  Patient advised and escorted to the ED after clinic appointment.  While escorting patient to the ED he stated that he didn't have time to go today and would come back.  I encouraged patient to see about the facial swelling and he said he understood and would maybe drive closer to the ED to check in and then asked where was the way out.

## 2025-06-17 NOTE — PROGRESS NOTES
"[facility] VASCULAR SURGERY   Clinic Note     HPI:   ABENA Richards is a 64 y.o. with PMHx of HLD, HTN, hypokalemia, and PAD presents to clinic for f/u after left to right fem-fem bypass and bilateral common femoral endarterectomy 2/14/25. The pt reports significantly improved claudication symptoms and can now walk "all over" without pain. The pt denies chest pain and shortness of breath. The pt has yet to have ABIs since his procedure. The pt does report swelling in the upper lip that he woke up this morning with. The pt has no history of allergies, is taking lisinopril. Denies cough and trouble swallowing and difficulty breathing.     Review of Systems:  10 point review of systems denied unless otherwise indicated above HPI    Allergies:   Review of patient's allergies indicates:  No Known Allergies    Meds:   Current Medications[1]    PMH:   Past Medical History:   Diagnosis Date    Hyperlipidemia     Hypertension     Hypokalemia     from FNP notes    PAD (peripheral artery disease)     from FNP notes        Social History:   Social History[2]    Family History:   Family History   Problem Relation Name Age of Onset    Hypertension Mother         Surgical History:   Past Surgical History:   Procedure Laterality Date    CREATION OF FEMORAL-FEMORAL ARTERY BYPASS WITH GRAFT N/A 2/14/2025    Procedure: CREATION, BYPASS, ARTERIAL, FEMORAL TO FEMORAL, USING GRAFT;  Surgeon: David Winter MD;  Location: [facility];  Service: Peripheral Vascular;  Laterality: N/A;  left to right fem fem bypass    ENDARTERECTOMY, COMMON FEMORAL Bilateral 2/14/2025    Procedure: ENDARTERECTOMY, COMMON FEMORAL;  Surgeon: David Winter MD;  Location: [facility];  Service: Peripheral Vascular;  Laterality: Bilateral;    FINGER AMPUTATION Left     5th digit       Objective:  Vitals:  Vitals:    06/17/25 1319   BP: 122/69   Pulse: 76   Resp: 18   Temp: 97.9 °F (36.6 °C)        Physical Exam:  Gen: NAD  Neuro: awake, alert, answering questions " appropriately  CV: RRR  Resp: non-labored breathing  Abd: soft, ND, NT  Ext: moves all 4 spontaneously and purposefully, palpable bilateral femoral pulses, DP pulses biphasic bilaterally and monophasic PT pulses bilaterally; incisions in bilateral groin closed with no edema erythema or drainage noted  Skin: warm, well perfused    Pertinent Labs:  None     Imaging:  None     Micro/Path/Other:  None     Assessment/Plan:  ABENA Richards is a 64 y.o. with PMHx of HLD, HTN, hypokalemia, and PAD presents to clinic for f/u after left to right fem-fem bypass 2/14/25. Physical exam notable for new onset angioedema, will refer the pt to the ED for further evaluation.     - f/u in clinic in 3 months with JAYLEN  - Recommend evaluation in the ED for angioedema    Marty Bright, MS 3  [facility]     I saw the patient with the medical student and agree with the documentation. I have altered the note as needed.    Marc Castellon MD. PhD  [facility] [facility] General Surgery, PGY1           [1]   Current Outpatient Medications:     amLODIPine (NORVASC) 5 MG tablet, Take 1 tablet (5 mg total) by mouth once daily., Disp: 90 tablet, Rfl: 1    aspirin (ECOTRIN) 81 MG EC tablet, Take 1 tablet (81 mg total) by mouth once daily., Disp: 90 tablet, Rfl: 3    atorvastatin (LIPITOR) 80 MG tablet, Take 1 tablet (80 mg total) by mouth every evening., Disp: 90 tablet, Rfl: 1    cilostazoL (PLETAL) 50 MG Tab, Take 1 tablet (50 mg total) by mouth 2 (two) times daily., Disp: 180 tablet, Rfl: 1    clopidogreL (PLAVIX) 75 mg tablet, Take 1 tablet (75 mg total) by mouth once daily., Disp: 30 tablet, Rfl: 11    lisinopriL (PRINIVIL,ZESTRIL) 2.5 MG tablet, Take 1 tablet (2.5 mg total) by mouth once daily., Disp: 90 tablet, Rfl: 1    traMADoL (ULTRAM) 50 mg tablet, Take 1 tablet (50 mg total) by mouth every 6 (six) hours as needed for Pain. Take 25 to 50 mg daily every 6 hours prn pain, Disp: 15 tablet, Rfl: 1    potassium chloride SA  "(CALOS,KLOR-CON) 20 MEQ tablet, Take 1 tablet (20 mEq total) by mouth 2 (two) times daily. (Patient not taking: Reported on 10/29/2024), Disp: 180 tablet, Rfl: 2  [2]   Social History  Tobacco Use    Smoking status: Former     Current packs/day: 1.00     Average packs/day: 1 pack/day for 40.0 years (40.0 ttl pk-yrs)     Types: Vaping with nicotine, Cigarettes    Smokeless tobacco: Never   Substance Use Topics    Alcohol use: Not Currently     Comment: stopped 5-10 years ago    Drug use: Yes     Types: Marijuana     Comment: "I smoke a lot" everyday     "

## 2025-07-18 DIAGNOSIS — I73.9 PERIPHERAL VASCULAR DISEASE, UNSPECIFIED: ICD-10-CM

## 2025-07-18 DIAGNOSIS — I73.9 PAD (PERIPHERAL ARTERY DISEASE): Primary | ICD-10-CM

## 2025-08-06 ENCOUNTER — HOSPITAL ENCOUNTER (OUTPATIENT)
Dept: RADIOLOGY | Facility: HOSPITAL | Age: 65
Discharge: HOME | End: 2025-08-06
Payer: MEDICAID

## 2025-08-06 ENCOUNTER — HOSPITAL ENCOUNTER (OUTPATIENT)
Dept: RADIOLOGY | Facility: HOSPITAL | Age: 65
Discharge: HOME | End: 2025-08-06
Attending: SURGERY
Payer: MEDICAID

## 2025-08-06 DIAGNOSIS — Z95.828 S/P FEMORAL-FEMORAL BYPASS SURGERY: ICD-10-CM

## 2025-08-06 DIAGNOSIS — I73.9 PAD (PERIPHERAL ARTERY DISEASE): ICD-10-CM

## 2025-08-06 DIAGNOSIS — I73.9 PERIPHERAL VASCULAR DISEASE, UNSPECIFIED: ICD-10-CM

## (undated) DEVICE — APPLIER LIGACLIP MED 11IN

## (undated) DEVICE — ELECTRODE BLADE INSULATED 1 IN

## (undated) DEVICE — SUT 7/0 24IN PROLENE BL MO

## (undated) DEVICE — GEL AQUASONIC 100 STERILE20GM

## (undated) DEVICE — BLADE SURG STAINLESS STEEL #15

## (undated) DEVICE — SUTURE SA83H SILK 4-0

## (undated) DEVICE — SPONGE LAP 18X18 PREWASHED

## (undated) DEVICE — Device

## (undated) DEVICE — SUT MCRYL PLUS 4-0 PS2 27IN

## (undated) DEVICE — COVER SITE-RITE PROBE 96IN

## (undated) DEVICE — SUT PROLENE 6-0 BV-1 30IN

## (undated) DEVICE — COVER TABLE HVY DTY 60X90IN

## (undated) DEVICE — DECANTER FLUID TRNSF WHITE 9IN

## (undated) DEVICE — LOOP STERION MINI RED 8X406MM

## (undated) DEVICE — GLOVE SENSICARE PI GRN 7

## (undated) DEVICE — SUT SA85H SILK 2-0

## (undated) DEVICE — SOL NORMAL USPCA 0.9%

## (undated) DEVICE — COVER PROXIMA MAYO STAND

## (undated) DEVICE — SUT VICRYL 3-0 27 SH

## (undated) DEVICE — GLOVE SENSICARE PI GRN 6.5

## (undated) DEVICE — GLOVE SIGNATURE MICRO LTX 7

## (undated) DEVICE — MANIFOLD 4 PORT

## (undated) DEVICE — KIT VASCULAR UNIVERSITY

## (undated) DEVICE — SUT 2/0 36IN COATED VICRYL

## (undated) DEVICE — ADHESIVE DERMABOND ADVANCED

## (undated) DEVICE — SOCKINETTE IMPERVIOUS 12X48IN

## (undated) DEVICE — GLOVE SENSICARE PI GRN 7.5

## (undated) DEVICE — GLOVE SENSICARE NEOPRENE 6.5

## (undated) DEVICE — APPLIER LIGACLIP SM 9.38IN

## (undated) DEVICE — GOWN POLY REINF BRTH SLV XL

## (undated) DEVICE — TOWEL OR DISP STRL BLUE 4/PK

## (undated) DEVICE — GLOVE SIGNATURE MICRO LTX 6.5

## (undated) DEVICE — SPONGE GAUZE 16PLY 4X4

## (undated) DEVICE — CLIPPER BLADE MOD 4406 (CAREF)

## (undated) DEVICE — ELECTRODE PATIENT RETURN DISP

## (undated) DEVICE — SPONGE SURGIFOAM 100 8.5X12X10

## (undated) DEVICE — HEMOSTAT SURGICEL FIBRLR 2X4IN

## (undated) DEVICE — TRAY CATH 1-LYR URIMTR 16FR

## (undated) DEVICE — DRAPE INCISE IOBAN 2 23X17IN

## (undated) DEVICE — GLOVE SIGNATURE MICRO LTX 6

## (undated) DEVICE — PENCIL ELECSURG ROCKER 15FT

## (undated) DEVICE — APPLICATOR CHLORAPREP ORN 26ML

## (undated) DEVICE — KIT SURGICAL TURNOVER

## (undated) DEVICE — LOOP STERION MAXI YEL 1X406MM